# Patient Record
Sex: MALE | Race: WHITE | NOT HISPANIC OR LATINO | Employment: OTHER | URBAN - METROPOLITAN AREA
[De-identification: names, ages, dates, MRNs, and addresses within clinical notes are randomized per-mention and may not be internally consistent; named-entity substitution may affect disease eponyms.]

---

## 2018-04-18 ENCOUNTER — HISTORICAL (OUTPATIENT)
Dept: LAB | Facility: HOSPITAL | Age: 78
End: 2018-04-18

## 2018-04-18 LAB
ABS NEUT (OLG): 2.92 X10(3)/MCL (ref 2.1–9.2)
ALBUMIN SERPL-MCNC: 3.1 GM/DL (ref 3.4–5)
ALBUMIN/GLOB SERPL: 1.3 {RATIO}
ALP SERPL-CCNC: 40 UNIT/L (ref 50–136)
ALT SERPL-CCNC: 27 UNIT/L (ref 12–78)
AST SERPL-CCNC: 27 UNIT/L (ref 15–37)
BASOPHILS # BLD AUTO: 0 X10(3)/MCL (ref 0–0.2)
BASOPHILS NFR BLD AUTO: 1 %
BILIRUB SERPL-MCNC: 0.7 MG/DL (ref 0.2–1)
BILIRUBIN DIRECT+TOT PNL SERPL-MCNC: 0.1 MG/DL (ref 0–0.2)
BILIRUBIN DIRECT+TOT PNL SERPL-MCNC: 0.6 MG/DL (ref 0–0.8)
BUN SERPL-MCNC: 23 MG/DL (ref 7–18)
CALCIUM SERPL-MCNC: 7.9 MG/DL (ref 8.5–10.1)
CHLORIDE SERPL-SCNC: 111 MMOL/L (ref 98–107)
CO2 SERPL-SCNC: 22 MMOL/L (ref 21–32)
CREAT SERPL-MCNC: 1.18 MG/DL (ref 0.7–1.3)
EOSINOPHIL # BLD AUTO: 0.1 X10(3)/MCL (ref 0–0.9)
EOSINOPHIL NFR BLD AUTO: 2 %
ERYTHROCYTE [DISTWIDTH] IN BLOOD BY AUTOMATED COUNT: 11.9 % (ref 11.5–17)
GLOBULIN SER-MCNC: 2.3 GM/DL (ref 2.4–3.5)
GLUCOSE SERPL-MCNC: 204 MG/DL (ref 74–106)
HCT VFR BLD AUTO: 32.9 % (ref 42–52)
HGB BLD-MCNC: 11.4 GM/DL (ref 14–18)
LYMPHOCYTES # BLD AUTO: 0.7 X10(3)/MCL (ref 0.6–4.6)
LYMPHOCYTES NFR BLD AUTO: 18 %
MCH RBC QN AUTO: 32.9 PG (ref 27–31)
MCHC RBC AUTO-ENTMCNC: 34.7 GM/DL (ref 33–36)
MCV RBC AUTO: 94.8 FL (ref 80–94)
MONOCYTES # BLD AUTO: 0.3 X10(3)/MCL (ref 0.1–1.3)
MONOCYTES NFR BLD AUTO: 7 %
NEUTROPHILS # BLD AUTO: 2.92 X10(3)/MCL (ref 2.1–9.2)
NEUTROPHILS NFR BLD AUTO: 72 %
PLATELET # BLD AUTO: 145 X10(3)/MCL (ref 130–400)
PMV BLD AUTO: 10.5 FL (ref 9.4–12.4)
POTASSIUM SERPL-SCNC: 4 MMOL/L (ref 3.5–5.1)
PROT SERPL-MCNC: 5.4 GM/DL (ref 6.4–8.2)
RBC # BLD AUTO: 3.47 X10(6)/MCL (ref 4.7–6.1)
SODIUM SERPL-SCNC: 141 MMOL/L (ref 136–145)
TSH SERPL-ACNC: 1.14 MIU/ML (ref 0.36–3.74)
WBC # SPEC AUTO: 4.1 X10(3)/MCL (ref 4.5–11.5)

## 2022-04-10 ENCOUNTER — HISTORICAL (OUTPATIENT)
Dept: ADMINISTRATIVE | Facility: HOSPITAL | Age: 82
End: 2022-04-10

## 2022-04-11 ENCOUNTER — HISTORICAL (OUTPATIENT)
Dept: ADMINISTRATIVE | Facility: HOSPITAL | Age: 82
End: 2022-04-11

## 2022-04-28 VITALS
DIASTOLIC BLOOD PRESSURE: 59 MMHG | WEIGHT: 145.75 LBS | HEIGHT: 68 IN | BODY MASS INDEX: 22.09 KG/M2 | OXYGEN SATURATION: 97 % | SYSTOLIC BLOOD PRESSURE: 108 MMHG

## 2022-04-28 VITALS
DIASTOLIC BLOOD PRESSURE: 59 MMHG | WEIGHT: 145.75 LBS | SYSTOLIC BLOOD PRESSURE: 108 MMHG | OXYGEN SATURATION: 97 % | BODY MASS INDEX: 22.09 KG/M2 | HEIGHT: 68 IN

## 2022-11-07 ENCOUNTER — CLINICAL SUPPORT (OUTPATIENT)
Dept: URGENT CARE | Facility: CLINIC | Age: 82
End: 2022-11-07
Payer: MEDICARE

## 2022-11-07 VITALS — RESPIRATION RATE: 18 BRPM

## 2022-11-07 DIAGNOSIS — Z23 NEED FOR INFLUENZA VACCINATION: Primary | ICD-10-CM

## 2022-11-07 PROCEDURE — G0008 FLU VACCINE - QUADRIVALENT - HIGH DOSE (65+) PRESERVATIVE FREE IM: ICD-10-PCS | Mod: ,,, | Performed by: FAMILY MEDICINE

## 2022-11-07 PROCEDURE — 90662 IIV NO PRSV INCREASED AG IM: CPT | Mod: ,,, | Performed by: FAMILY MEDICINE

## 2022-11-07 PROCEDURE — G0008 ADMIN INFLUENZA VIRUS VAC: HCPCS | Mod: ,,, | Performed by: FAMILY MEDICINE

## 2022-11-07 PROCEDURE — 90662 FLU VACCINE - QUADRIVALENT - HIGH DOSE (65+) PRESERVATIVE FREE IM: ICD-10-PCS | Mod: ,,, | Performed by: FAMILY MEDICINE

## 2023-02-09 ENCOUNTER — DOCUMENTATION ONLY (OUTPATIENT)
Dept: CASE MANAGEMENT | Facility: HOSPITAL | Age: 83
End: 2023-02-09
Payer: MEDICARE

## 2023-02-09 NOTE — PROGRESS NOTES
Sent faxed referral for [facility] on 2/8/23 and spoke with Mercedez regarding pt admission. Mercedez requested referral be sent again electronically. Due to uncompleted documentation, referral sent electronically this morning 2/9/23.

## 2023-02-10 ENCOUNTER — HOSPITAL ENCOUNTER (INPATIENT)
Facility: HOSPITAL | Age: 83
LOS: 13 days | Discharge: HOME HEALTH | DRG: 682 | End: 2023-02-23
Attending: EMERGENCY MEDICINE | Admitting: INTERNAL MEDICINE
Payer: MEDICARE

## 2023-02-10 DIAGNOSIS — R07.9 CHEST PAIN: ICD-10-CM

## 2023-02-10 DIAGNOSIS — R55 SYNCOPE, NEAR: ICD-10-CM

## 2023-02-10 DIAGNOSIS — R00.1 BRADYCARDIA: ICD-10-CM
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
## 2023-02-10 DIAGNOSIS — S32.010A CLOSED COMPRESSION FRACTURE OF BODY OF L1 VERTEBRA: Primary | ICD-10-CM

## 2023-02-10 LAB
ALBUMIN SERPL-MCNC: 3.4 G/DL (ref 3.4–4.8)
ALBUMIN/GLOB SERPL: 1 RATIO (ref 1.1–2)
ALP SERPL-CCNC: 55 UNIT/L (ref 40–150)
ALT SERPL-CCNC: 19 UNIT/L (ref 0–55)
APPEARANCE UR: CLEAR
AST SERPL-CCNC: 28 UNIT/L (ref 5–34)
BACTERIA #/AREA URNS AUTO: NORMAL /HPF
BASOPHILS # BLD AUTO: 0.03 X10(3)/MCL (ref 0–0.2)
BASOPHILS NFR BLD AUTO: 0.5 %
BILIRUB UR QL STRIP.AUTO: NEGATIVE MG/DL
BILIRUBIN DIRECT+TOT PNL SERPL-MCNC: 0.7 MG/DL
BUN SERPL-MCNC: 45 MG/DL (ref 8.4–25.7)
CALCIUM SERPL-MCNC: 9 MG/DL (ref 8.8–10)
CHLORIDE SERPL-SCNC: 109 MMOL/L (ref 98–107)
CO2 SERPL-SCNC: 21 MMOL/L (ref 23–31)
COLOR UR AUTO: YELLOW
CREAT SERPL-MCNC: 1.84 MG/DL (ref 0.73–1.18)
EOSINOPHIL # BLD AUTO: 0.18 X10(3)/MCL (ref 0–0.9)
EOSINOPHIL NFR BLD AUTO: 3.2 %
ERYTHROCYTE [DISTWIDTH] IN BLOOD BY AUTOMATED COUNT: 12.1 % (ref 11.5–17)
GFR SERPLBLD CREATININE-BSD FMLA CKD-EPI: 36 MLS/MIN/1.73/M2
GLOBULIN SER-MCNC: 3.5 GM/DL (ref 2.4–3.5)
GLUCOSE SERPL-MCNC: 94 MG/DL (ref 82–115)
GLUCOSE UR QL STRIP.AUTO: NEGATIVE MG/DL
HCT VFR BLD AUTO: 35.6 % (ref 42–52)
HGB BLD-MCNC: 12 GM/DL (ref 14–18)
IMM GRANULOCYTES # BLD AUTO: 0.03 X10(3)/MCL (ref 0–0.04)
IMM GRANULOCYTES NFR BLD AUTO: 0.5 %
KETONES UR QL STRIP.AUTO: NEGATIVE MG/DL
LEUKOCYTE ESTERASE UR QL STRIP.AUTO: NEGATIVE UNIT/L
LYMPHOCYTES # BLD AUTO: 0.69 X10(3)/MCL (ref 0.6–4.6)
LYMPHOCYTES NFR BLD AUTO: 12.2 %
MCH RBC QN AUTO: 32.3 PG
MCHC RBC AUTO-ENTMCNC: 33.7 MG/DL (ref 33–36)
MCV RBC AUTO: 96 FL (ref 80–94)
MONOCYTES # BLD AUTO: 0.57 X10(3)/MCL (ref 0.1–1.3)
MONOCYTES NFR BLD AUTO: 10.1 %
NEUTROPHILS # BLD AUTO: 4.14 X10(3)/MCL (ref 2.1–9.2)
NEUTROPHILS NFR BLD AUTO: 73.5 %
NITRITE UR QL STRIP.AUTO: NEGATIVE
NRBC BLD AUTO-RTO: 0 %
PH UR STRIP.AUTO: 5.5 [PH]
PLATELET # BLD AUTO: 168 X10(3)/MCL (ref 130–400)
PMV BLD AUTO: 10 FL (ref 7.4–10.4)
POTASSIUM SERPL-SCNC: 4.2 MMOL/L (ref 3.5–5.1)
PROT SERPL-MCNC: 6.9 GM/DL (ref 5.8–7.6)
PROT UR QL STRIP.AUTO: ABNORMAL MG/DL
RBC # BLD AUTO: 3.71 X10(6)/MCL (ref 4.7–6.1)
RBC #/AREA URNS AUTO: <5 /HPF
RBC UR QL AUTO: NEGATIVE UNIT/L
SODIUM SERPL-SCNC: 141 MMOL/L (ref 136–145)
SP GR UR STRIP.AUTO: 1.02 (ref 1–1.03)
SQUAMOUS #/AREA URNS AUTO: <5 /HPF
UROBILINOGEN UR STRIP-ACNC: 0.2 MG/DL
WBC # SPEC AUTO: 5.6 X10(3)/MCL (ref 4.5–11.5)
WBC #/AREA URNS AUTO: <5 /HPF

## 2023-02-10 PROCEDURE — 85025 COMPLETE CBC W/AUTO DIFF WBC: CPT | Performed by: NURSE PRACTITIONER

## 2023-02-10 PROCEDURE — 63600175 PHARM REV CODE 636 W HCPCS: Performed by: INTERNAL MEDICINE

## 2023-02-10 PROCEDURE — 93005 ELECTROCARDIOGRAM TRACING: CPT

## 2023-02-10 PROCEDURE — 96360 HYDRATION IV INFUSION INIT: CPT

## 2023-02-10 PROCEDURE — 81001 URINALYSIS AUTO W/SCOPE: CPT | Performed by: NURSE PRACTITIONER

## 2023-02-10 PROCEDURE — 25000003 PHARM REV CODE 250: Performed by: INTERNAL MEDICINE

## 2023-02-10 PROCEDURE — 63600175 PHARM REV CODE 636 W HCPCS: Performed by: NURSE PRACTITIONER

## 2023-02-10 PROCEDURE — 11000001 HC ACUTE MED/SURG PRIVATE ROOM

## 2023-02-10 PROCEDURE — 93010 ELECTROCARDIOGRAM REPORT: CPT | Mod: ,,, | Performed by: INTERNAL MEDICINE

## 2023-02-10 PROCEDURE — 93010 EKG 12-LEAD: ICD-10-PCS | Mod: ,,, | Performed by: INTERNAL MEDICINE

## 2023-02-10 PROCEDURE — 80053 COMPREHEN METABOLIC PANEL: CPT | Performed by: NURSE PRACTITIONER

## 2023-02-10 PROCEDURE — 25000003 PHARM REV CODE 250

## 2023-02-10 PROCEDURE — 99285 EMERGENCY DEPT VISIT HI MDM: CPT | Mod: 25

## 2023-02-10 RX ORDER — HALOPERIDOL 5 MG/ML
2 INJECTION INTRAMUSCULAR EVERY 6 HOURS PRN
Status: DISCONTINUED | OUTPATIENT
Start: 2023-02-10 | End: 2023-02-23 | Stop reason: HOSPADM

## 2023-02-10 RX ORDER — TAMSULOSIN HYDROCHLORIDE 0.4 MG/1
1 CAPSULE ORAL 2 TIMES DAILY
Status: DISCONTINUED | OUTPATIENT
Start: 2023-02-10 | End: 2023-02-22

## 2023-02-10 RX ORDER — SERTRALINE HYDROCHLORIDE 50 MG/1
50 TABLET, FILM COATED ORAL NIGHTLY
COMMUNITY
Start: 2023-01-11

## 2023-02-10 RX ORDER — FINASTERIDE 5 MG/1
5 TABLET, FILM COATED ORAL DAILY
COMMUNITY
Start: 2023-01-04

## 2023-02-10 RX ORDER — ACETAMINOPHEN 325 MG/1
650 TABLET ORAL EVERY 4 HOURS PRN
Status: DISCONTINUED | OUTPATIENT
Start: 2023-02-10 | End: 2023-02-23 | Stop reason: HOSPADM

## 2023-02-10 RX ORDER — MEMANTINE HYDROCHLORIDE 5 MG/1
10 TABLET ORAL 2 TIMES DAILY
Status: DISCONTINUED | OUTPATIENT
Start: 2023-02-10 | End: 2023-02-22

## 2023-02-10 RX ORDER — TAMSULOSIN HYDROCHLORIDE 0.4 MG/1
1 CAPSULE ORAL 2 TIMES DAILY
COMMUNITY
Start: 2023-01-03

## 2023-02-10 RX ORDER — MORPHINE SULFATE 4 MG/ML
2 INJECTION, SOLUTION INTRAMUSCULAR; INTRAVENOUS EVERY 4 HOURS PRN
Status: DISCONTINUED | OUTPATIENT
Start: 2023-02-10 | End: 2023-02-23 | Stop reason: HOSPADM

## 2023-02-10 RX ORDER — SODIUM CHLORIDE 0.9 % (FLUSH) 0.9 %
10 SYRINGE (ML) INJECTION
Status: DISCONTINUED | OUTPATIENT
Start: 2023-02-10 | End: 2023-02-23 | Stop reason: HOSPADM

## 2023-02-10 RX ORDER — POLYETHYLENE GLYCOL 3350 17 G/17G
17 POWDER, FOR SOLUTION ORAL 2 TIMES DAILY PRN
Status: DISCONTINUED | OUTPATIENT
Start: 2023-02-10 | End: 2023-02-23 | Stop reason: HOSPADM

## 2023-02-10 RX ORDER — MULTIVITAMIN
1 TABLET ORAL DAILY
COMMUNITY

## 2023-02-10 RX ORDER — MEMANTINE HYDROCHLORIDE 10 MG/1
10 TABLET ORAL 2 TIMES DAILY
COMMUNITY
Start: 2022-12-04

## 2023-02-10 RX ORDER — ONDANSETRON 2 MG/ML
4 INJECTION INTRAMUSCULAR; INTRAVENOUS EVERY 4 HOURS PRN
Status: DISCONTINUED | OUTPATIENT
Start: 2023-02-10 | End: 2023-02-23 | Stop reason: HOSPADM

## 2023-02-10 RX ORDER — SODIUM CHLORIDE 450 MG/100ML
INJECTION, SOLUTION INTRAVENOUS CONTINUOUS
Status: DISCONTINUED | OUTPATIENT
Start: 2023-02-10 | End: 2023-02-12

## 2023-02-10 RX ORDER — HYDROCODONE BITARTRATE AND ACETAMINOPHEN 5; 325 MG/1; MG/1
1 TABLET ORAL
Status: COMPLETED | OUTPATIENT
Start: 2023-02-10 | End: 2023-02-10

## 2023-02-10 RX ORDER — FINASTERIDE 5 MG/1
5 TABLET, FILM COATED ORAL DAILY
Status: DISCONTINUED | OUTPATIENT
Start: 2023-02-11 | End: 2023-02-23 | Stop reason: HOSPADM

## 2023-02-10 RX ORDER — HYDROCODONE BITARTRATE AND ACETAMINOPHEN 5; 325 MG/1; MG/1
1 TABLET ORAL EVERY 6 HOURS PRN
Status: DISCONTINUED | OUTPATIENT
Start: 2023-02-10 | End: 2023-02-13

## 2023-02-10 RX ORDER — AMOXICILLIN 250 MG
2 CAPSULE ORAL 2 TIMES DAILY PRN
Status: DISCONTINUED | OUTPATIENT
Start: 2023-02-10 | End: 2023-02-22

## 2023-02-10 RX ORDER — MAG HYDROX/ALUMINUM HYD/SIMETH 200-200-20
30 SUSPENSION, ORAL (FINAL DOSE FORM) ORAL 4 TIMES DAILY PRN
Status: DISCONTINUED | OUTPATIENT
Start: 2023-02-10 | End: 2023-02-23 | Stop reason: HOSPADM

## 2023-02-10 RX ORDER — HYDRALAZINE HYDROCHLORIDE 20 MG/ML
10 INJECTION INTRAMUSCULAR; INTRAVENOUS EVERY 4 HOURS PRN
Status: DISCONTINUED | OUTPATIENT
Start: 2023-02-10 | End: 2023-02-23 | Stop reason: HOSPADM

## 2023-02-10 RX ORDER — ENOXAPARIN SODIUM 100 MG/ML
30 INJECTION SUBCUTANEOUS EVERY 24 HOURS
Status: DISCONTINUED | OUTPATIENT
Start: 2023-02-11 | End: 2023-02-14

## 2023-02-10 RX ORDER — PROCHLORPERAZINE EDISYLATE 5 MG/ML
5 INJECTION INTRAMUSCULAR; INTRAVENOUS EVERY 6 HOURS PRN
Status: DISCONTINUED | OUTPATIENT
Start: 2023-02-10 | End: 2023-02-23 | Stop reason: HOSPADM

## 2023-02-10 RX ORDER — QUETIAPINE FUMARATE 25 MG/1
50 TABLET, FILM COATED ORAL NIGHTLY PRN
Status: DISCONTINUED | OUTPATIENT
Start: 2023-02-10 | End: 2023-02-11

## 2023-02-10 RX ORDER — SIMETHICONE 80 MG
1 TABLET,CHEWABLE ORAL 4 TIMES DAILY PRN
Status: DISCONTINUED | OUTPATIENT
Start: 2023-02-10 | End: 2023-02-23 | Stop reason: HOSPADM

## 2023-02-10 RX ORDER — ASCORBIC ACID 500 MG
500 TABLET ORAL DAILY
COMMUNITY

## 2023-02-10 RX ORDER — FLUTICASONE PROPIONATE 50 MCG
1 SPRAY, SUSPENSION (ML) NASAL DAILY PRN
COMMUNITY
Start: 2022-12-18

## 2023-02-10 RX ORDER — TALC
6 POWDER (GRAM) TOPICAL NIGHTLY PRN
Status: DISCONTINUED | OUTPATIENT
Start: 2023-02-10 | End: 2023-02-17

## 2023-02-10 RX ORDER — ACETAMINOPHEN 500 MG
1000 TABLET ORAL EVERY 6 HOURS PRN
Status: DISCONTINUED | OUTPATIENT
Start: 2023-02-10 | End: 2023-02-14

## 2023-02-10 RX ORDER — SERTRALINE HYDROCHLORIDE 50 MG/1
50 TABLET, FILM COATED ORAL NIGHTLY
Status: DISCONTINUED | OUTPATIENT
Start: 2023-02-10 | End: 2023-02-13

## 2023-02-10 RX ADMIN — SODIUM CHLORIDE 1000 ML: 900 INJECTION, SOLUTION INTRAVENOUS at 02:02

## 2023-02-10 RX ADMIN — SODIUM CHLORIDE: 4.5 INJECTION, SOLUTION INTRAVENOUS at 09:02

## 2023-02-10 RX ADMIN — SERTRALINE HYDROCHLORIDE 50 MG: 50 TABLET ORAL at 09:02

## 2023-02-10 RX ADMIN — HYDROCODONE BITARTRATE AND ACETAMINOPHEN 1 TABLET: 5; 325 TABLET ORAL at 02:02

## 2023-02-10 RX ADMIN — ONDANSETRON 4 MG: 2 INJECTION INTRAMUSCULAR; INTRAVENOUS at 09:02

## 2023-02-10 RX ADMIN — MORPHINE SULFATE 2 MG: 4 INJECTION INTRAVENOUS at 09:02

## 2023-02-10 RX ADMIN — HALOPERIDOL LACTATE 2 MG: 5 INJECTION, SOLUTION INTRAMUSCULAR at 09:02

## 2023-02-10 RX ADMIN — TAMSULOSIN HYDROCHLORIDE 0.4 MG: 0.4 CAPSULE ORAL at 09:02

## 2023-02-10 RX ADMIN — MEMANTINE 10 MG: 5 TABLET ORAL at 09:02

## 2023-02-10 NOTE — ED PROVIDER NOTES
Encounter Date: 2/10/2023       History     Chief Complaint   Patient presents with    Back Pain     Unresolved Back pain after fall last Tuesday. C/o hip pain , reports another fall last night and landed on left hip, not on BT, denies hitting head, no LOC.. Noted with aspen brace.     Dysuria     The patient is a 82 y.o. male who presents to the Emergency Department with a chief complaint of fall. Daughter at bedside reports patient was diagnosed with L1 Compression fracture two days ago. He was placed in aspen brace and was scheduled to follow up with neurosurgery on Tuesday. Daughter states sometime last night he experienced an unwitnessed fall and was found the the floor. It is unclear if the patient hit his head or neck. Daughter states he seems more confused that normal over the last few days. She also reports that he has been having difficulty urinating. Symptoms began 2 days ago and have been intermittent since onset. His pain is currently rated as a 2/10 in severity and described as aching with no radiation. Associated symptoms include lower back pain. Symptoms are aggravated with movement and ambulation and there are no alleviating factors. The patient denies numbness or tingling to extremities, loss of bowel/bladder, saddle anesthesia. He reports taking nothing prior to arrival with no relief of symptoms. No other reported symptoms at this time     The history is provided by the patient and a caregiver. No  was used.   Fall  The accident occurred yesterday. The fall occurred while walking. He fell from a height of 3 to 5 ft. He landed on A hard floor. There was no blood loss. The point of impact was the left hip. The pain is present in the back. The pain is at a severity of 2/10. He was Ambulatory at the scene. There was No entrapment after the fall. There was No drug use involved in the accident. There was No alcohol use involved in the accident. Associated symptoms include back pain.  Pertinent negatives include no neck pain, no paresthesias, no paralysis, no visual change, no fever, no numbness, no abdominal pain, no bowel incontinence, no nausea, no vomiting, no headaches, no loss of consciousness and no tingling. The symptoms are aggravated by activity. He has tried nothing for the symptoms. The treatment provided no relief.   Review of patient's allergies indicates:  No Known Allergies  History reviewed. No pertinent past medical history.  History reviewed. No pertinent surgical history.  History reviewed. No pertinent family history.  Social History     Tobacco Use    Smoking status: Never    Smokeless tobacco: Never     Review of Systems   Constitutional:  Negative for fever.   HENT:  Negative for congestion, rhinorrhea and sore throat.    Respiratory:  Negative for shortness of breath.    Cardiovascular:  Negative for chest pain.   Gastrointestinal:  Negative for abdominal pain, bowel incontinence, nausea and vomiting.   Genitourinary:  Negative for dysuria, frequency and urgency.   Musculoskeletal:  Positive for back pain. Negative for neck pain.   Skin:  Negative for rash.   Neurological:  Negative for tingling, loss of consciousness, weakness, numbness, headaches and paresthesias.   Hematological:  Does not bruise/bleed easily.   Psychiatric/Behavioral:  Positive for confusion. Negative for behavioral problems.    All other systems reviewed and are negative.    Physical Exam     Initial Vitals   BP Pulse Resp Temp SpO2   02/10/23 1155 02/10/23 1152 02/10/23 1152 02/10/23 1152 02/10/23 1152   (!) 107/52 64 18 97.3 °F (36.3 °C) 100 %      MAP       --                Physical Exam    Nursing note and vitals reviewed.  Constitutional: Vital signs are normal. He appears well-developed and well-nourished.   HENT:   Head: Normocephalic.   Right Ear: Hearing and tympanic membrane normal.   Left Ear: Hearing and tympanic membrane normal.   Mouth/Throat: Uvula is midline, oropharynx is clear and  moist and mucous membranes are normal.   Cardiovascular:  Normal rate, regular rhythm, normal heart sounds and normal pulses.           Pulmonary/Chest: Effort normal and breath sounds normal.   Abdominal: Abdomen is soft. Bowel sounds are normal. There is no abdominal tenderness.   Musculoskeletal:      Cervical back: No spinous process tenderness or muscular tenderness.      Lumbar back: Tenderness present.      Right hip: Normal.      Left hip: Normal.      Comments: Aspen LSO brace in place.          Lymphadenopathy:     He has no cervical adenopathy.   Neurological: He is alert. He has normal strength. GCS eye subscore is 4. GCS verbal subscore is 4. GCS motor subscore is 6.   5/5 strength to bilateral upper and lower extremities    Skin: Skin is warm, dry and intact. Capillary refill takes less than 2 seconds.       ED Course   Procedures  Labs Reviewed   URINALYSIS, REFLEX TO URINE CULTURE - Abnormal; Notable for the following components:       Result Value    Protein, UA Trace (*)     All other components within normal limits   CBC WITH DIFFERENTIAL - Abnormal; Notable for the following components:    RBC 3.71 (*)     Hgb 12.0 (*)     Hct 35.6 (*)     MCV 96.0 (*)     All other components within normal limits   COMPREHENSIVE METABOLIC PANEL - Abnormal; Notable for the following components:    Chloride 109 (*)     Carbon Dioxide 21 (*)     Blood Urea Nitrogen 45.0 (*)     Creatinine 1.84 (*)     Albumin/Globulin Ratio 1.0 (*)     All other components within normal limits   URINALYSIS, MICROSCOPIC - Normal          Imaging Results              CT Head Without Contrast (Final result)  Result time 02/10/23 17:25:11      Final result by Lavinia Jason MD (02/10/23 17:25:11)                   Impression:      Chronic age-related changes.  No acute process      Electronically signed by: Lavinia Jason  Date:    02/10/2023  Time:    17:25               Narrative:    EXAMINATION:  CT HEAD WITHOUT  CONTRAST    CLINICAL HISTORY:  Head trauma, minor (Age >= 65y);    TECHNIQUE:  Multiple axial images were obtained from the base of the brain to the vertex without contrast administration.  Sagittal and coronal reconstructions were performed..Automatic exposure control is utilized to reduce patient radiation exposure.    COMPARISON:  None    FINDINGS:  There is no intracranial mass or lesion seen.  No hemorrhage is seen.  No acute infarct is seen.  There is some cerebral atrophy seen.  There is some compensatory ventricular dilatation and periventricular white matter changes consistent with the patient's age.  Calvarium is intact.  The posterior fossa is unremarkable..  The visualized portions of the paranasal sinuses show no acute abnormality.                                       CT Cervical Spine Without Contrast (Final result)  Result time 02/10/23 17:28:14      Final result by Lavinia Jason MD (02/10/23 17:28:14)                   Impression:      Degenerative changes in the cervical spine    No evidence of acute trauma      Electronically signed by: Lavinia Jason  Date:    02/10/2023  Time:    17:28               Narrative:    EXAMINATION:  CT CERVICAL SPINE WITHOUT CONTRAST    CLINICAL HISTORY:  Neck trauma (Age >= 65y);    TECHNIQUE:  Low dose axial images, sagittal and coronal reformations were performed though the cervical spine.  Contrast was not administered. Automatic exposure control is utilized to reduce patient radiation exposure.    COMPARISON:  None    FINDINGS:  The vertebral body heights are well maintained.  The alignment is well maintained.  The bones are  osteoporotic.  There are degenerative changes seen throughout the cervical spine.  No evidence of acute fracture is seen.  No dislocation is seen.  Odontoid lateral masses appear grossly unremarkable.  No soft tissue abnormality is seen.  No retropharyngeal abnormality is seen.  Visualized portions of the airway appear grossly  unremarkable.  Visualized portion of the thoracic inlet appears unremarkable                                       X-Ray Hip 2 or 3 views Left (with Pelvis when performed) (Final result)  Result time 02/10/23 12:28:55      Final result by Latasha Barba MD (02/10/23 12:28:55)                   Impression:      No acute osseous abnormality.      Electronically signed by: Latasha Barba  Date:    02/10/2023  Time:    12:28               Narrative:    EXAMINATION:  XR HIP WITH PELVIS WHEN PERFORMED, 2 OR 3 VIEWS LEFT    CLINICAL HISTORY:  Unspecified fall, initial encounter    TECHNIQUE:  AP view of the pelvis and AP and frog leg lateral view of the left hip were performed.    COMPARISON:  None.    FINDINGS:  BONE: No fracture. No dislocation.    SOFT TISSUES: There are surgical anchors related to hernia repair.                                       Medications   sodium chloride 0.9% bolus 1,000 mL 1,000 mL (0 mLs Intravenous Stopped 2/10/23 1549)   HYDROcodone-acetaminophen 5-325 mg per tablet 1 tablet (1 tablet Oral Given 2/10/23 1447)     Medical Decision Making:   Initial Assessment:   The patient is a 82 y.o. male who presents to the Emergency Department with a chief complaint of fall. Daughter at bedside reports patient was diagnosed with L1 Compression fracture two days ago. He was placed in aspen brace and was scheduled to follow up with neurosurgery on Tuesday. Daughter states sometime last night he experienced an unwitnessed fall and was found the the floor. It is unclear if the patient hit his head or neck. Daughter states he seems more confused that normal over the last few days. She also reports that he has been having difficulty urinating. Symptoms began 2 days ago and have been intermittent since onset. His pain is currently rated as a 2/10 in severity and described as aching with no radiation. Associated symptoms include lower back pain. Symptoms are aggravated with movement and ambulation and  there are no alleviating factors. The patient denies numbness or tingling to extremities, loss of bowel/bladder, saddle anesthesia. He reports taking nothing prior to arrival with no relief of symptoms. No other reported symptoms at this time   Differential Diagnosis:   Fall, lumbar fracture, urinary tract infection, intracranial bleeding, cervical fracture, CVA  Clinical Tests:   Lab Tests: Ordered and Reviewed  Radiological Study: Ordered and Reviewed  ED Management:  MDM  History obtained by patient.   Co-morbidities and/or factors adding to the complexity or risk for the patient?: age  Differential diagnoses: Fall, lumbar fracture, urinary tract infection, intracranial bleeding, cervical fracture, CVA  Decision to obtain previous or outside records?: yes  Chart Review (nursing home, outside records, CareEverywhere): reviewed previous ER records  Review of RX medications/new RX prescribed by me?: no  My EKG Interpretation: see above  Labs/imaging/other tests obtained/considered (risk/benefits of testing discussed): CBC, CMP, CT head and neck  Labs/tests intepretation: normal imaging. BUN and creatinine elevated  My independent imaging interpretation: none  Treatment/interventions, IV fluids, IV medications: PO norco  Complex management (IV controlled substances, went to OR, DNR, meds requiring monitoring, transfer, etc)?: none  Workup/treatment affected by social determinants of health?: none   Point of care US done/interpretation: none  Consults/radiologist/EMS/social work/family discussion/alternate history: none  Advanced care planning/end of life discussion: none  Shared decision making: Shared decision making with patient  ETOH/smoking/drug cessation discussion: n/a  Dispo: admit. Daughter concerned about multiple falls at home. States he lives with wife who is able to help much.    Other:   I have discussed this case with another health care provider.       <> Summary of the Discussion: Discussed with ED  attending, , he had face to face encounter with patient.     Discussed with AFUA Sanchez with hospital medicine. She accepts admission for Dr. Lawson           ED Course as of 02/10/23 1853   Fri Feb 10, 2023   1403 BUN(!): 45.0 [LM]   1403 Creatinine(!): 1.84 [LM]      ED Course User Index  [LM] Kaur Schaffer NP                 Clinical Impression:   Final diagnoses:  [W19.XXXA] Fall  [S32.010A] Closed compression fracture of body of L1 vertebra (Primary)        ED Disposition Condition    Admit Stable                Kaur Schaffer NP  02/10/23 1817       Kaur Schaffer NP  02/10/23 1853

## 2023-02-10 NOTE — FIRST PROVIDER EVALUATION
Medical screening examination initiated.  I have conducted a focused provider triage encounter, findings are as follows:  Chief Complaint   Patient presents with    Back Pain     Unresolved Back pain after fall last Tuesday. C/o hip pain , reports another fall last night and landed on left hip, not on BT, denies hitting head, no LOC.. Noted with aspen brace.     Dysuria         Brief history of present illness:  81 y/o male who presents with known L1 fracture from 2/8 CT scan (in computer). Had fall last night and hurt his left hip. No head injury. In back brace. Also having difficulty urinating.     Vitals:    02/10/23 1152 02/10/23 1155   BP:  (!) 107/52   Pulse: 64    Resp: 18    Temp: 97.3 °F (36.3 °C)    TempSrc: Oral    SpO2: 100%        Pertinent physical exam:  alert, in wheelchair, nonlabored    Brief workup plan:  labs, imaging    Preliminary workup initiated; this workup will be continued and followed by the physician or advanced practice provider that is assigned to the patient when roomed.

## 2023-02-10 NOTE — Clinical Note
Diagnosis: Fall [239497]   Admitting Provider:: DEBORAH OLIVER [[id number]]   Future Attending Provider: DEBORAH OLIVER [[id number]]   Reason for IP Medical Treatment  (Clinical interventions that can only be accomplished in the IP setting? ) :: multiple falls, TEJAS?   Estimated Length of Stay:: 2 midnights   I certify that Inpatient services for greater than or equal to 2 midnights are medically necessary:: Yes   Plans for Post-Acute care--if anticipated (pick the single best option):: A. No post acute care anticipated at this time

## 2023-02-11 LAB
ANION GAP SERPL CALC-SCNC: 11 MEQ/L
BUN SERPL-MCNC: 37.8 MG/DL (ref 8.4–25.7)
CALCIUM SERPL-MCNC: 8.3 MG/DL (ref 8.8–10)
CHLORIDE SERPL-SCNC: 107 MMOL/L (ref 98–107)
CO2 SERPL-SCNC: 22 MMOL/L (ref 23–31)
CREAT SERPL-MCNC: 1.67 MG/DL (ref 0.73–1.18)
CREAT/UREA NIT SERPL: 23
ERYTHROCYTE [DISTWIDTH] IN BLOOD BY AUTOMATED COUNT: 12 % (ref 11.5–17)
GFR SERPLBLD CREATININE-BSD FMLA CKD-EPI: 41 MLS/MIN/1.73/M2
GLUCOSE SERPL-MCNC: 92 MG/DL (ref 82–115)
HCT VFR BLD AUTO: 31.8 % (ref 42–52)
HGB BLD-MCNC: 10.4 GM/DL (ref 14–18)
MAGNESIUM SERPL-MCNC: 1.9 MG/DL (ref 1.6–2.6)
MCH RBC QN AUTO: 31.8 PG
MCHC RBC AUTO-ENTMCNC: 32.7 MG/DL (ref 33–36)
MCV RBC AUTO: 97.2 FL (ref 80–94)
NRBC BLD AUTO-RTO: 0 %
PHOSPHATE SERPL-MCNC: 3.2 MG/DL (ref 2.3–4.7)
PLATELET # BLD AUTO: 152 X10(3)/MCL (ref 130–400)
PMV BLD AUTO: 9.9 FL (ref 7.4–10.4)
POTASSIUM SERPL-SCNC: 4.2 MMOL/L (ref 3.5–5.1)
RBC # BLD AUTO: 3.27 X10(6)/MCL (ref 4.7–6.1)
SODIUM SERPL-SCNC: 140 MMOL/L (ref 136–145)
WBC # SPEC AUTO: 5.8 X10(3)/MCL (ref 4.5–11.5)

## 2023-02-11 PROCEDURE — 84100 ASSAY OF PHOSPHORUS: CPT | Performed by: INTERNAL MEDICINE

## 2023-02-11 PROCEDURE — 63600175 PHARM REV CODE 636 W HCPCS: Performed by: NURSE PRACTITIONER

## 2023-02-11 PROCEDURE — 80048 BASIC METABOLIC PNL TOTAL CA: CPT | Performed by: INTERNAL MEDICINE

## 2023-02-11 PROCEDURE — 11000001 HC ACUTE MED/SURG PRIVATE ROOM

## 2023-02-11 PROCEDURE — 63600175 PHARM REV CODE 636 W HCPCS: Performed by: INTERNAL MEDICINE

## 2023-02-11 PROCEDURE — 25000003 PHARM REV CODE 250: Performed by: INTERNAL MEDICINE

## 2023-02-11 PROCEDURE — 51702 INSERT TEMP BLADDER CATH: CPT

## 2023-02-11 PROCEDURE — 85027 COMPLETE CBC AUTOMATED: CPT | Performed by: INTERNAL MEDICINE

## 2023-02-11 PROCEDURE — 83735 ASSAY OF MAGNESIUM: CPT | Performed by: INTERNAL MEDICINE

## 2023-02-11 PROCEDURE — 97162 PT EVAL MOD COMPLEX 30 MIN: CPT

## 2023-02-11 PROCEDURE — 25000003 PHARM REV CODE 250: Performed by: NURSE PRACTITIONER

## 2023-02-11 RX ORDER — QUETIAPINE FUMARATE 25 MG/1
25 TABLET, FILM COATED ORAL NIGHTLY
Status: DISCONTINUED | OUTPATIENT
Start: 2023-02-11 | End: 2023-02-13

## 2023-02-11 RX ADMIN — HYDROCODONE BITARTRATE AND ACETAMINOPHEN 1 TABLET: 5; 325 TABLET ORAL at 05:02

## 2023-02-11 RX ADMIN — MEMANTINE 10 MG: 5 TABLET ORAL at 08:02

## 2023-02-11 RX ADMIN — FINASTERIDE 5 MG: 5 TABLET, FILM COATED ORAL at 08:02

## 2023-02-11 RX ADMIN — QUETIAPINE FUMARATE 25 MG: 25 TABLET ORAL at 08:02

## 2023-02-11 RX ADMIN — TAMSULOSIN HYDROCHLORIDE 0.4 MG: 0.4 CAPSULE ORAL at 08:02

## 2023-02-11 RX ADMIN — THERA TABS 1 TABLET: TAB at 08:02

## 2023-02-11 RX ADMIN — HALOPERIDOL LACTATE 2 MG: 5 INJECTION, SOLUTION INTRAMUSCULAR at 01:02

## 2023-02-11 RX ADMIN — ENOXAPARIN SODIUM 30 MG: 30 INJECTION SUBCUTANEOUS at 07:02

## 2023-02-11 RX ADMIN — Medication 6 MG: at 02:02

## 2023-02-11 RX ADMIN — SODIUM CHLORIDE: 4.5 INJECTION, SOLUTION INTRAVENOUS at 08:02

## 2023-02-11 RX ADMIN — SODIUM CHLORIDE: 4.5 INJECTION, SOLUTION INTRAVENOUS at 10:02

## 2023-02-11 RX ADMIN — SERTRALINE HYDROCHLORIDE 50 MG: 50 TABLET ORAL at 08:02

## 2023-02-11 RX ADMIN — ACETAMINOPHEN 1000 MG: 500 TABLET, FILM COATED ORAL at 08:02

## 2023-02-11 RX ADMIN — HYDROCODONE BITARTRATE AND ACETAMINOPHEN 1 TABLET: 5; 325 TABLET ORAL at 12:02

## 2023-02-11 NOTE — PLAN OF CARE
Problem: Physical Therapy  Goal: Physical Therapy Goal  Description: Goals to be met by: 2023     Patient will increase functional independence with mobility by performin. Supine to sit with Modified [place]  2. Sit to supine with Modified [place]  3. Bed to chair transfer with Modified [place] using No Assistive Device  4. Gait  x 400 feet with [place] using No Assistive Device.     Outcome: Ongoing, Progressing

## 2023-02-11 NOTE — PT/OT/SLP EVAL
Physical Therapy Evaluation    Patient Name:  John Collado   MRN:  [id number]    Recommendations:     Discharge Recommendations: home health PT   Discharge Equipment Recommendations: walker, rolling   Barriers to discharge: None    Assessment:     John Collado is a 82 y.o. male admitted with a medical diagnosis of Fall, B hip pain, L1 compression fx.  He presents with the following impairments/functional limitations: weakness, impaired endurance, gait instability, decreased lower extremity function .    Rehab Prognosis: Good; patient would benefit from acute skilled PT services to address these deficits and reach maximum level of function.    Recent Surgery: * No surgery found *      Plan:     During this hospitalization, patient to be seen 6 x/week to address the identified rehab impairments via gait training, therapeutic activities, therapeutic exercises, neuromuscular re-education and progress toward the following goals:    Plan of Care Expires:       Subjective     Chief Complaint: No complaints  Patient/Family Comments/goals: To return to home health and increase stability in gait.  Pain/Comfort:  Location - Side 1: Bilateral  Location - Orientation 1: midline  Location 1:  (back)  Pain Addressed 1: Reposition, Nurse notified    Patients cultural, spiritual, Uatsdin conflicts given the current situation: no    Living Environment:  Home with spouse  Prior to admission, patients level of function was Independent runner.  Equipment used at home:  .  DME owned (not currently used): none.  Upon discharge, patient will have assistance from spouse.    Objective:     Communicated with RN prior to session.  Patient found supine with mead catheter  and roll belt upon PT entry to room.    General Precautions: Standard,    Orthopedic Precautions:    Braces: TLSO  Respiratory Status: Room air    Exams:    RLE ROM: WNL  RLE Strength: 4/5 quad, 3+/5 hip flexor  LLE ROM: WNL  LLE Strength: 4/5 quad, 3+/5 hip  flexor    Functional Mobility:  Bed Mobility:     Scooting: minimum assistance  Supine to Sit: minimum assistance  Transfers:     Sit to Stand:  minimum assistance with rolling walker  Gait: 100ft with RW and TLSO, mod Vcs for steering and walker safety         Patient left HOB elevated with all lines intact and restraints reapplied at end of session.    GOALS:   Multidisciplinary Problems       Physical Therapy Goals          Problem: Physical Therapy    Goal Priority Disciplines Outcome Goal Variances Interventions   Physical Therapy Goal     PT, PT/OT Ongoing, Progressing     Description: Goals to be met by: 2023     Patient will increase functional independence with mobility by performin. Supine to sit with Modified [place]  2. Sit to supine with Modified [place]  3. Bed to chair transfer with Modified [place] using No Assistive Device  4. Gait  x 400 feet with [place] using No Assistive Device.                          History:     History reviewed. No pertinent past medical history.    History reviewed. No pertinent surgical history.    Time Tracking:     PT Received On: 23  PT Start Time: 1150     PT Stop Time: 1220  PT Total Time (min): 30 min     Billable Minutes: Evaluation 30 mins      2023

## 2023-02-11 NOTE — CONSULTS
[facility]                       [address] [place] [id number]    PATIENT NAME:       RAMONITA HAMLIN  YOB: 1940  CSN:                [id number]   MRN:                [id number]  ADMIT DATE:         02/10/2023 11:59:00  PHYSICIAN:          Varun Partida MD                            CONSULTATION    DATE OF CONSULT:  2/11/2023    HISTORY OF PRESENT ILLNESS:  Patient is an gentleman, with severe   dementia, recently diagnosed with an L1 compression fracture, who presented to   the ED with left hip pain.  Patient's daughter was with him.  He lives with his   wife, is independent and functional ADLs.  Patient's wife was present during his   fall.  There was no head trauma or loss of consciousness.  We have been   consulted for urinary retention.  It is unclear whether he has a urologist.  He   has baseline bradycardia.    PAST MEDICAL HISTORY:  Dementia, L1 compression fracture.    SURGICAL HISTORY:  No pertinent surgical history identified.    ALLERGIES:  NO KNOWN DRUG ALLERGIES.     PHYSICAL EXAM:  VITAL SIGNS:  Patient is afebrile.  His vital signs are stable.    GENERAL:  In no acute distress.  ABDOMEN:  Soft, nontender and nondistended.    Flanks nontender.  NEUROLOGIC:  Patient is demented.  He is moving around on the   bed.  He is on 4-point restraints.  Looks comfortable but agitated.  :    Catheter is in position.  Urine is crystal clear.    ASSESSMENT AND PLAN:  Apparently, he was in urinary retention when he came to   the hospital.  A catheter was placed, it put out about 1500 mL of clear yellow   urine.  He is on Flomax and finasteride.  Plan is for him to continue these   medications.  He will need a voiding trial in a week.  This can be performed as   an outpatient.  If he does not have a primary urologist, we would be happy to   follow him.        ______________________________  Varun BARROSO  MD [place] Partida/WEN  DD:  02/11/2023  Time:  04:36PM  DT:  02/11/2023  Time:  05:18PM  Job #:  [id number]      CONSULTATION

## 2023-02-11 NOTE — NURSING
Nurses Note -- 4 Eyes      2/11/2023   3:01 AM      Skin assessed during: Admit      [x] No Pressure Injuries Present    []Prevention Measures Documented      [] Yes- Altered Skin Integrity Present or Discovered   [] LDA Added if Not in Epic (Describe Wound)   [] New Altered Skin Integrity was Present on Admit and Documented in LDA   [] Wound Image Taken    Wound Care Consulted? No    Attending Nurse:  Teddy Cutler RN     Second RN/Staff Member:  Denise Howard RN

## 2023-02-11 NOTE — H&P
[facility] Hospital Medicine   History & Physical Note      Patient Name: John Collado  : 1940  MRN: [id number]  PCP: John Meyers MD  Admitting Service: Hospital Medicine  Attending Physician: Carrol James MD  Admission Date: 2/10/2023 - IP- Inpatient   Length of Stay: 0  History source: EMR, patient and/or patient's family  Code status: Full    Chief Complaint   Back Pain (Unresolved Back pain after fall last Tuesday. C/o hip pain , reports another fall last night and landed on left hip, not on BT, denies hitting head, no LOC.. Noted with aspen brace. ) and Dysuria      History of Present Illness   82-year-old male with dementia and recently diagnosed L1 compression fracture on 22 presents to the ED with complaints of left hip pain after sustaining another ground level fall yesterday evening. Pt daughter at bedside, gives report. Pt lives with wife and is independent of functional ADLs. Pt wife was present during initial fall and there was no head trauma or LOC however details of fall yesterday are unclear. Pt with baseline bradycardia per daughter, stating that he runs 1 miles daily up until a few days ago.     Initial ED VS:  Temperature 97.3°, heart rate 64, respirations 18, blood pressure 149/77.  While in the ED patient with heart rate in the 50s.  EKG currently pending.  X-ray of the hip was negative for acute fractures.  Due to her repeated falls she is being admitted to the hospitalist service for rehab placement.  She also has an mild TEJAS.    ROS   Except as documented, all other systems reviewed and negative     Past Medical History   Dementia   L1 compression fracture diagnosed on 23    Past Surgical History   History reviewed. No pertinent surgical history.    Social History   Denies alcohol, tobacco or illicit drug use.  She lives alone and is independent of ADLs.    Family History   Reviewed and negative    Allergies   Patient has no known  allergies.    Home Medications     Prior to Admission medications    Medication Sig Start Date End Date Taking? Authorizing Provider   ascorbic acid, vitamin C, (VITAMIN C) 500 MG tablet Take 500 mg by mouth once daily.   Yes Historical Provider   cyclobenzaprine (FLEXERIL) 10 MG tablet Take 1 tablet (10 mg total) by mouth 3 (three) times daily as needed for Muscle spasms. 2/8/23 2/13/23 Yes Cody Hernandez IV, MD   finasteride (PROSCAR) 5 mg tablet Take 5 mg by mouth once daily. 1/4/23  Yes Historical Provider   fluticasone propionate (FLONASE) 50 mcg/actuation nasal spray 1 spray by Each Nostril route daily as needed. 12/18/22  Yes Historical Provider   HYDROcodone-acetaminophen (NORCO) 5-325 mg per tablet Take 1 tablet by mouth every 6 (six) hours as needed for Pain. 2/8/23  Yes Cody Hernandez IV, MD   LIDOcaine (LIDODERM) 5 % Place 1 patch onto the skin once daily. Remove & Discard patch within 12 hours or as directed by MD for 7 days 2/8/23 2/15/23 Yes Cody Hernandez IV, MD   memantine (NAMENDA) 10 MG Tab Take 10 mg by mouth 2 (two) times daily. 12/4/22  Yes Historical Provider   multivitamin with folic acid 400 mcg Tab Take 1 tablet by mouth once daily.   Yes Historical Provider   sertraline (ZOLOFT) 50 MG tablet Take 50 mg by mouth every evening. 1/11/23  Yes Historical Provider   tamsulosin (FLOMAX) 0.4 mg Cap Take 1 capsule by mouth 2 (two) times daily. 1/3/23  Yes Historical Provider          Physical Exam   Vital Signs  Pulse:  [53-58]   Resp:  [16-18]   BP: (107-149)/(52-77)   SpO2:  [95 %-96 %]    General: Appears comfortable, pleasantly demented  HEENT: NC/AT  Neck:  No JVD  Chest: CTABL  CVS: bradycardia, Normal S1/S2.  Abdomen: nondistended, normoactive BS, soft and non-tender.  MSK: No obvious deformity or joint swelling, pain to bilateral hips with movement.  Skin: Warm and dry  Neuro: AAOx3, no focal neurological deficit  Psych: Cooperative    Labs     Recent Labs     02/10/23  1235   WBC 5.6   RBC  3.71*   HGB 12.0*   HCT 35.6*   MCV 96.0*   MCH 32.3   MCHC 33.7   RDW 12.1        No results for input(s): PROTIME, INR, PTT, D-DIMER, FERRITIN, IRON, TRANS, TIBC, LABIRON, [id number], FOLATE, LDH, HAPTOGLOBIN, RETICCNTAUTO, RETABS, PERIPSMEAREV in the last 72 hours.   Recent Labs     02/10/23  1235      K 4.2   CHLORIDE 109*   CO2 21*   BUN 45.0*   CREATININE 1.84*   EGFRNORACEVR 36   GLUCOSE 94   CALCIUM 9.0   ALBUMIN 3.4   GLOBULIN 3.5   ALKPHOS 55   ALT 19   AST 28   BILITOT 0.7     No results for input(s): LACTIC in the last 72 hours.  No results for input(s): CPK, TROPONINI in the last 72 hours.       Microbiology Results (last 7 days)       ** No results found for the last 168 hours. **           Imaging     CT Head Without Contrast   Final Result      Chronic age-related changes.  No acute process         Electronically signed by: Lavinia Jason   Date:    02/10/2023   Time:    17:25      CT Cervical Spine Without Contrast   Final Result      Degenerative changes in the cervical spine      No evidence of acute trauma         Electronically signed by: Lavinia Jason   Date:    02/10/2023   Time:    17:28      X-Ray Hip 2 or 3 views Left (with Pelvis when performed)   Final Result      No acute osseous abnormality.         Electronically signed by: Latasha Barba   Date:    02/10/2023   Time:    12:28        Assessment & Plan   Recurrent falls without LOC  Bilateral Hip pain with decreased mobility  L1 compression fracture diagnosed on 02/08/2023  TEJAS on ? CKD  Bradycardia, chronic   Dementia    PLAN:   - Telemetry. EKG pending. Echocardiogram and Carotid US  - PRN analgesics. IVFs, avoid nephrotoxic agents  - PT consult. Fall precautions.  - Discontinue Flexeril (daughter reports AH after taking it yesterday)  - CBC, CMP  - VTE Prophylaxis: Consuelo Mathew, FNP-C have discussed this patients case with Dr. James who agrees with the diagnosis and treatment  plan.  ------------------------------------    Carrol James MD  I performed the substantive portion of this visit. I had a face-to-face time with the patient on [2/10/2023]. I reviewed the nurse practitioner's history, physical exam and plan of care.      History: Hx of dementia, recent fall on 2/8/2023, L! Compression fracture, started on muscle relaxer and analgesics, recurrent falls since then, increasing confusion, sleep cycle reversal and increasing forgetfulness and intermittent agitation.    Physical exam: Alert, pleasantly demented, trying to urinate using the urinal, following commands, no focal motor weakness    Medical decision making:  Discussed with daughter that recurrent falls and increasing forgetfulness and agitation and disturbed sleep cycle likely due to his recent fall/pain/and new medications causing delirium.  Will start low-dose Seroquel 25 mg at bedtime, delirium precaution, continue gentle IV hydration with half-normal saline.  PT evaluation in a.m.    His pain appeared to be controlled at this time will hold further analgesics and muscle relaxers, as no significant pain there is no indication for kyphoplasty at this time.

## 2023-02-11 NOTE — PROGRESS NOTES
[facility] Hospital Medicine Progress Note        Chief Complaint: Inpatient Follow-up for L Hip Pain s/p Fall; L1 Fracture    HPI:   82-year-old male with Dementia and recently diagnosed L1 Compression Fracture on 02/08/22 presents to the ED with complaints of left hip pain after sustaining another ground level fall yesterday evening. Pt daughter at bedside, gives report. Pt lives with wife and is independent of functional ADLs. Pt wife was present during initial fall and there was no head trauma or LOC however details of fall yesterday are unclear. Pt with baseline bradycardia per daughter, stating that he runs 1 miles daily up until a few days ago.      Initial VS showed Temperature 97.3°, heart rate 64, respirations 18, blood pressure 149/77.  While in the ED patient with HR in the 50s.  X-ray of the hip was negative for acute fractures.  Due to his repeated falls he is being admitted to the hospitalist service for PT evaluation & Rehab placement.  He also has an mild TEJAS.    Interval Hx:   Today, Mr Collado was seen at bedside with his wife and daughter.  He stated he was doing well and did not have any new complaints.  He was noted to be somewhat confused & disoriented likely due to baseline dementia.  PT/OT consulted.  Patient noted to have 900 mL in bladder after he had urge to pee and could not void.  Placing Mcfadden and will consult Urology. Echocardiogram and Carotid U/S ordered and pending; will follow up on report.  BUN/creatinine noted to be 37.9/1.67, continued on IV 0.45% NS at 100 mL/hr.    Objective/physical exam:  General: In no acute distress, afebrile  Chest: Clear to auscultation bilaterally  Heart: RRR, +S1, S2, no appreciable murmur  Abdomen: Soft, nontender, BS +  MSK: Warm, no lower extremity edema, no clubbing or cyanosis  Neurologic: Alert and oriented x4, Cranial nerve II-XII intact, Strength 5/5 in all 4 extremities    VITAL SIGNS: 24 HRS MIN & MAX LAST   Temp   Min: 97.4 °F (36.3 °C)  Max: 98.8 °F (37.1 °C) 97.4 °F (36.3 °C)   BP  Min: 77/37  Max: 149/77 (!) 100/57     Pulse  Min: 50  Max: 58  (!) 50     Resp  Min: 16  Max: 18 18   SpO2  Min: 87 %  Max: 96 % (!) 87 %         Recent Labs   Lab 02/10/23  1235 02/11/23  0512   WBC 5.6 5.8   RBC 3.71* 3.27*   HGB 12.0* 10.4*   HCT 35.6* 31.8*   MCV 96.0* 97.2*   MCH 32.3 31.8   MCHC 33.7 32.7*   RDW 12.1 12.0    152   MPV 10.0 9.9       Recent Labs   Lab 02/10/23  1235 02/11/23  0512    140   K 4.2 4.2   CO2 21* 22*   BUN 45.0* 37.8*   CREATININE 1.84* 1.67*   CALCIUM 9.0 8.3*   MG  --  1.90   ALBUMIN 3.4  --    ALKPHOS 55  --    ALT 19  --    AST 28  --    BILITOT 0.7  --      Scheduled Med:   enoxaparin  30 mg Subcutaneous Daily    finasteride  5 mg Oral Daily    memantine  10 mg Oral BID    multivitamin  1 tablet Oral Daily    QUEtiapine  25 mg Oral QHS    sertraline  50 mg Oral QHS    tamsulosin  1 capsule Oral BID      Continuous Infusions:   sodium chloride 0.45% 100 mL/hr at 02/11/23 1035      PRN Meds:  acetaminophen, acetaminophen, aluminum-magnesium hydroxide-simethicone, haloperidol lactate, hydrALAZINE, HYDROcodone-acetaminophen, melatonin, morphine, ondansetron, polyethylene glycol, prochlorperazine, senna-docusate 8.6-50 mg, simethicone, sodium chloride 0.9%     Assessment/Plan:  Recurrent Falls without LOC 2/2 Presyncope vs Dehydration  Bilateral Hip Pain with Decreased Mobility  L1 Compression Fracture (Dx on 02/08)  TEJAS 2/2 Intravascular Depletion  Bradycardia  Dementia  Normocytic Anemia  Urine Retention with TEJAS    Patient continues to be admitted for close monitoring   But continue q.4 hours neuro checks   Echocardiogram, Carotid U/S pending; will follow-up   Patient continued on IV 0.45% NS at 100 mL/R for TEJAS   PT/OT consulted; will follow recommendations for placement   Patient continued on finasteride 5 mg, tamsulosin 0.4 mg, memantine 10 mg, quetiapine 25 mg, sertraline 50 mg  Inserted  Mcfadden catheter and consult Urology; follow recommendations  Continue monitoring patient's symptoms    VTE prophylaxis:  Lovenox    Patient condition:  Stable    Anticipated discharge and Disposition:     Pending    All diagnosis and differential diagnosis have been reviewed; assessment and plan has been documented; I have personally reviewed the labs and test results that are presently available; I have reviewed the patients medication list; I have reviewed the consulting providers response and recommendations. I have reviewed or attempted to review medical records based upon their availability    All of the patient's questions have been  addressed and answered. Patient's is agreeable to the above stated plan. I will continue to monitor closely and make adjustments to medical management as needed.  _____________________________________________________________________    Radiology:  US Retroperitoneal Complete  Narrative: EXAMINATION:  US RETROPERITONEAL COMPLETE    CLINICAL HISTORY:  Urinary Obstruction;    TECHNIQUE:  Ultrasound of the kidneys and urinary bladder was performed including color flow and grayscale evaluation of the kidneys.    COMPARISON:  CT lumbar spine 02/08/2023    FINDINGS:  LIMITATIONS: Exam limited due to poor acoustic window related to shadowing bowel gas and/or body habitus.    RIGHT KIDNEY: Lower pole is obscured by shadowing.  The right kidney measures 9 cm.  No hydronephrosis.  Incidental 1 cm cyst at the upper pole.  Size likely underestimated based on appearance on recent CT exam.  No follow-up imaging is recommended per consensus recommendations based on imaging criteria.   No appreciable shadowing renal calculus.    LEFT KIDNEY: The left kidney measures 8.4 cm. No hydronephrosis.No cystic renal mass. Left nephrolithiasis.  Stones measure up to 5 mm upon review of CT exam.    BLADDER: Collapsed about a Mcfadden.    OTHER: Aorta and IVC are obscured by shadowing gas.  Impression: No  hydronephrosis    Nonobstructing left nephrolithiasis    Electronically signed by: Latasha Barba  Date:    02/11/2023  Time:    13:16      Rosas Gracia MD   02/11/2023

## 2023-02-11 NOTE — ED NOTES
Pt attempting to get out of ER stretcher from end of bed with both side rails raised after being educated on need to stay in bed. Pt more confused and agitated. Pt placed in roll belt for safety. Bed in lowest position, side rails raised, non-slip socks on pt. Room curtain remains open for direct visualization of pt.

## 2023-02-12 LAB
ANION GAP SERPL CALC-SCNC: 11 MEQ/L
AV INDEX (PROSTH): 0.66
AV MEAN GRADIENT: 3 MMHG
AV PEAK GRADIENT: 5 MMHG
AV VALVE AREA: 2.06 CM2
AV VELOCITY RATIO: 0.69
BSA FOR ECHO PROCEDURE: 1.83 M2
BUN SERPL-MCNC: 36.3 MG/DL (ref 8.4–25.7)
CALCIUM SERPL-MCNC: 8.7 MG/DL (ref 8.8–10)
CHLORIDE SERPL-SCNC: 113 MMOL/L (ref 98–107)
CO2 SERPL-SCNC: 18 MMOL/L (ref 23–31)
CREAT SERPL-MCNC: 1.69 MG/DL (ref 0.73–1.18)
CREAT/UREA NIT SERPL: 21
CV ECHO LV RWT: 0.34 CM
DOP CALC AO PEAK VEL: 1.07 M/S
DOP CALC AO VTI: 27.6 CM
DOP CALC LVOT AREA: 3.1 CM2
DOP CALC LVOT DIAMETER: 2 CM
DOP CALC LVOT PEAK VEL: 0.74 M/S
DOP CALC LVOT STROKE VOLUME: 56.83 CM3
DOP CALC MV VTI: 33.9 CM
DOP CALCLVOT PEAK VEL VTI: 18.1 CM
E WAVE DECELERATION TIME: 215 MSEC
E/A RATIO: 1.4
E/E' RATIO: 7.7 M/S
ECHO LV POSTERIOR WALL: 0.85 CM (ref 0.6–1.1)
EJECTION FRACTION: 60 %
ERYTHROCYTE [DISTWIDTH] IN BLOOD BY AUTOMATED COUNT: 11.9 % (ref 11.5–17)
FRACTIONAL SHORTENING: 26 % (ref 28–44)
GFR SERPLBLD CREATININE-BSD FMLA CKD-EPI: 40 MLS/MIN/1.73/M2
GLUCOSE SERPL-MCNC: 99 MG/DL (ref 82–115)
HCT VFR BLD AUTO: 29.7 % (ref 42–52)
HGB BLD-MCNC: 10.2 GM/DL (ref 14–18)
INTERVENTRICULAR SEPTUM: 0.79 CM (ref 0.6–1.1)
LEFT ATRIUM SIZE: 4 CM
LEFT ATRIUM VOLUME INDEX MOD: 27.1 ML/M2
LEFT ATRIUM VOLUME MOD: 49.6 CM3
LEFT INTERNAL DIMENSION IN SYSTOLE: 3.69 CM (ref 2.1–4)
LEFT VENTRICLE DIASTOLIC VOLUME INDEX: 64.48 ML/M2
LEFT VENTRICLE DIASTOLIC VOLUME: 118 ML
LEFT VENTRICLE MASS INDEX: 76 G/M2
LEFT VENTRICLE SYSTOLIC VOLUME INDEX: 31.6 ML/M2
LEFT VENTRICLE SYSTOLIC VOLUME: 57.8 ML
LEFT VENTRICULAR INTERNAL DIMENSION IN DIASTOLE: 4.99 CM (ref 3.5–6)
LEFT VENTRICULAR MASS: 139.7 G
LV LATERAL E/E' RATIO: 7.7 M/S
LV SEPTAL E/E' RATIO: 7.7 M/S
LVOT MG: 1 MMHG
LVOT MV: 0.48 CM/S
MAGNESIUM SERPL-MCNC: 1.9 MG/DL (ref 1.6–2.6)
MCH RBC QN AUTO: 32.5 PG
MCHC RBC AUTO-ENTMCNC: 34.3 MG/DL (ref 33–36)
MCV RBC AUTO: 94.6 FL (ref 80–94)
MV MEAN GRADIENT: 1 MMHG
MV PEAK A VEL: 0.55 M/S
MV PEAK E VEL: 0.77 M/S
MV PEAK GRADIENT: 3 MMHG
MV STENOSIS PRESSURE HALF TIME: 63 MS
MV VALVE AREA BY CONTINUITY EQUATION: 1.68 CM2
MV VALVE AREA P 1/2 METHOD: 3.49 CM2
NRBC BLD AUTO-RTO: 0 %
PHOSPHATE SERPL-MCNC: 3.6 MG/DL (ref 2.3–4.7)
PISA TR MAX VEL: 2.84 M/S
PLATELET # BLD AUTO: 164 X10(3)/MCL (ref 130–400)
PMV BLD AUTO: 9.7 FL (ref 7.4–10.4)
POTASSIUM SERPL-SCNC: 4.4 MMOL/L (ref 3.5–5.1)
PV PEAK VELOCITY: 1.91 CM/S
RA PRESSURE: 8 MMHG
RBC # BLD AUTO: 3.14 X10(6)/MCL (ref 4.7–6.1)
SODIUM SERPL-SCNC: 142 MMOL/L (ref 136–145)
TDI LATERAL: 0.1 M/S
TDI SEPTAL: 0.1 M/S
TDI: 0.1 M/S
TR MAX PG: 32 MMHG
TRICUSPID ANNULAR PLANE SYSTOLIC EXCURSION: 1.96 CM
TV REST PULMONARY ARTERY PRESSURE: 40 MMHG
WBC # SPEC AUTO: 5.9 X10(3)/MCL (ref 4.5–11.5)

## 2023-02-12 PROCEDURE — 11000001 HC ACUTE MED/SURG PRIVATE ROOM

## 2023-02-12 PROCEDURE — 80048 BASIC METABOLIC PNL TOTAL CA: CPT | Performed by: NURSE PRACTITIONER

## 2023-02-12 PROCEDURE — 0240U COVID/FLU A&B PCR: CPT | Performed by: INTERNAL MEDICINE

## 2023-02-12 PROCEDURE — 84100 ASSAY OF PHOSPHORUS: CPT | Performed by: NURSE PRACTITIONER

## 2023-02-12 PROCEDURE — 51702 INSERT TEMP BLADDER CATH: CPT

## 2023-02-12 PROCEDURE — 63600175 PHARM REV CODE 636 W HCPCS: Performed by: INTERNAL MEDICINE

## 2023-02-12 PROCEDURE — 83735 ASSAY OF MAGNESIUM: CPT | Performed by: NURSE PRACTITIONER

## 2023-02-12 PROCEDURE — 63600175 PHARM REV CODE 636 W HCPCS: Performed by: STUDENT IN AN ORGANIZED HEALTH CARE EDUCATION/TRAINING PROGRAM

## 2023-02-12 PROCEDURE — 85027 COMPLETE CBC AUTOMATED: CPT | Performed by: NURSE PRACTITIONER

## 2023-02-12 PROCEDURE — 25000003 PHARM REV CODE 250: Performed by: NURSE PRACTITIONER

## 2023-02-12 PROCEDURE — 25000003 PHARM REV CODE 250: Performed by: INTERNAL MEDICINE

## 2023-02-12 RX ORDER — SODIUM CHLORIDE, SODIUM LACTATE, POTASSIUM CHLORIDE, CALCIUM CHLORIDE 600; 310; 30; 20 MG/100ML; MG/100ML; MG/100ML; MG/100ML
INJECTION, SOLUTION INTRAVENOUS CONTINUOUS
Status: DISCONTINUED | OUTPATIENT
Start: 2023-02-12 | End: 2023-02-13

## 2023-02-12 RX ADMIN — QUETIAPINE FUMARATE 25 MG: 25 TABLET ORAL at 08:02

## 2023-02-12 RX ADMIN — ENOXAPARIN SODIUM 30 MG: 30 INJECTION SUBCUTANEOUS at 05:02

## 2023-02-12 RX ADMIN — SERTRALINE HYDROCHLORIDE 50 MG: 50 TABLET ORAL at 08:02

## 2023-02-12 RX ADMIN — Medication 6 MG: at 08:02

## 2023-02-12 RX ADMIN — MEMANTINE 10 MG: 5 TABLET ORAL at 08:02

## 2023-02-12 RX ADMIN — Medication 6 MG: at 12:02

## 2023-02-12 RX ADMIN — POLYETHYLENE GLYCOL 3350 17 G: 17 POWDER, FOR SOLUTION ORAL at 10:02

## 2023-02-12 RX ADMIN — TAMSULOSIN HYDROCHLORIDE 0.4 MG: 0.4 CAPSULE ORAL at 08:02

## 2023-02-12 RX ADMIN — SENNOSIDES AND DOCUSATE SODIUM 2 TABLET: 50; 8.6 TABLET ORAL at 05:02

## 2023-02-12 RX ADMIN — ACETAMINOPHEN 1000 MG: 500 TABLET, FILM COATED ORAL at 08:02

## 2023-02-12 RX ADMIN — SODIUM CHLORIDE, POTASSIUM CHLORIDE, SODIUM LACTATE AND CALCIUM CHLORIDE: 600; 310; 30; 20 INJECTION, SOLUTION INTRAVENOUS at 03:02

## 2023-02-12 RX ADMIN — ACETAMINOPHEN 1000 MG: 500 TABLET, FILM COATED ORAL at 02:02

## 2023-02-12 RX ADMIN — FINASTERIDE 5 MG: 5 TABLET, FILM COATED ORAL at 08:02

## 2023-02-12 RX ADMIN — SODIUM CHLORIDE, POTASSIUM CHLORIDE, SODIUM LACTATE AND CALCIUM CHLORIDE: 600; 310; 30; 20 INJECTION, SOLUTION INTRAVENOUS at 10:02

## 2023-02-12 RX ADMIN — THERA TABS 1 TABLET: TAB at 08:02

## 2023-02-12 NOTE — NURSING
Contacted NP on call ROSMERY Barriga, about daughter concerns that pt had a stroke due to jerk like movements and pt gaze into the lights for a few seconds. An assessment was completed, vss except o2 was decreased. O2 2L NC and labs were ordered

## 2023-02-12 NOTE — PROGRESS NOTES
[facility]  Hospital Medicine Progress Note        Chief Complaint: Inpatient Follow-up for L Hip Pain s/p Fall; L1 Fracture    HPI:   Mr Collado is a 82-year-old gentleman with Dementia and recently diagnosed L1 Compression Fracture on 02/08/22 presents to the ED with complaints of left hip pain after sustaining another ground level fall yesterday evening. Pt daughter at bedside, gives report. Pt lives with wife and is independent of functional ADLs. Pt wife was present during initial fall and there was no head trauma or LOC however details of fall yesterday are unclear. Pt with baseline bradycardia per daughter, stating that he runs 1 miles daily up until a few days ago.      Initial VS showed Temperature 97.3°, heart rate 64, respirations 18, blood pressure 149/77.  While in the ED patient with HR in the 50s.  X-ray of the hip was negative for acute fractures.  Due to his repeated falls he is being admitted to the hospitalist service for PT evaluation & Rehab placement. He also had an mild TEJAS. PT/OT consulted.  Patient noted to have 900 mL in bladder after he had urge to pee and could not void.  Placing Mcfadden and will consult Urology. Echocardiogram and Carotid U/S ordered and pending; will follow up on report.  BUN/creatinine noted to be 37.9/1.67, continued on IV 0.45% NS at 100 mL/hr.    Interval Hx:   Today, Mr Collado was seen at bedside; he stated he was doing well and did not have any new complaints.  Noted to be more awake today.  Daughter reported increased confusion and hallucinations since yesterday afternoon told this morning which is thought to be secondary to delirium which was explained to her.  Urology on board, following recommendations.  Echocardiogram showing EF 60%, PASP 40 mmHg.  U/S B/L carotids was unremarkable.  Will change IVF to IV LR to prevent further worsening on NAGMA which is likely from fluids.  BUN/creatinine noted to be at 36.3/1.69 which may be  baseline.  Strictly no benzodiazepines or antihistamines to be used for fear of worsening delirium, patient is in mittens and can be restrained as needed.  Use redirection as much as possible for delirium.  IV Haldol if necessary for agitation.    Objective/physical exam:  General: In no acute distress, afebrile  Chest: Clear to auscultation bilaterally  Heart: RRR, +S1, S2, no appreciable murmur  Abdomen: Soft, nontender, BS +  MSK: Warm, no lower extremity edema, no clubbing or cyanosis  Neurologic: Alert and oriented x4, Cranial nerve II-XII intact, Strength 5/5 in all 4 extremities    VITAL SIGNS: 24 HRS MIN & MAX LAST   Temp  Min: 97.9 °F (36.6 °C)  Max: 98.2 °F (36.8 °C) 98.2 °F (36.8 °C)   BP  Min: 126/74  Max: 144/64 126/74     Pulse  Min: 55  Max: 86  64     Resp  Min: 17  Max: 19 18   SpO2  Min: 88 %  Max: 97 % 95 %         Recent Labs   Lab 02/10/23  1235 02/11/23  0512 02/12/23  0211   WBC 5.6 5.8 5.9   RBC 3.71* 3.27* 3.14*   HGB 12.0* 10.4* 10.2*   HCT 35.6* 31.8* 29.7*   MCV 96.0* 97.2* 94.6*   MCH 32.3 31.8 32.5   MCHC 33.7 32.7* 34.3   RDW 12.1 12.0 11.9    152 164   MPV 10.0 9.9 9.7         Recent Labs   Lab 02/10/23  1235 02/11/23  0512 02/12/23  0211    140 142   K 4.2 4.2 4.4   CO2 21* 22* 18*   BUN 45.0* 37.8* 36.3*   CREATININE 1.84* 1.67* 1.69*   CALCIUM 9.0 8.3* 8.7*   MG  --  1.90 1.90   ALBUMIN 3.4  --   --    ALKPHOS 55  --   --    ALT 19  --   --    AST 28  --   --    BILITOT 0.7  --   --        Scheduled Med:   enoxaparin  30 mg Subcutaneous Daily    finasteride  5 mg Oral Daily    memantine  10 mg Oral BID    multivitamin  1 tablet Oral Daily    QUEtiapine  25 mg Oral QHS    sertraline  50 mg Oral QHS    tamsulosin  1 capsule Oral BID      Continuous Infusions:   sodium chloride 0.45% 100 mL/hr at 02/11/23 2040      PRN Meds:  acetaminophen, acetaminophen, aluminum-magnesium hydroxide-simethicone, haloperidol lactate, hydrALAZINE, HYDROcodone-acetaminophen, melatonin,  morphine, ondansetron, polyethylene glycol, prochlorperazine, senna-docusate 8.6-50 mg, simethicone, sodium chloride 0.9%     Assessment/Plan:  Recurrent Falls without LOC 2/2 Presyncope vs Dehydration  Bilateral Hip Pain with Decreased Mobility  L1 Compression Fracture (Dx on 02/08)  TEJAS 2/2 Intravascular Depletion vs Post-Obstruction  Bradycardia  AMS 2/2 Delirium +/- Dementia  Normocytic Anemia  Urine Retention with TEJAS    Patient continues to be admitted for close monitoring   But continue q.4 hours neuro checks   Echocardiogram, Carotid U/S unremarkable   Patient continued on IV LR at 75 mL/hr for TEJAS   PT/OT consulted; will follow recommendations for placement   Patient continued on finasteride 5 mg, tamsulosin 0.4 mg, memantine 10 mg, quetiapine 25 mg, sertraline 50 mg  Inserted Mcfadden catheter and consulted Urology; following recommendations  Continue monitoring patient's symptoms    VTE prophylaxis:  Lovenox    Patient condition:  Stable    Anticipated discharge and Disposition:     Pending    All diagnosis and differential diagnosis have been reviewed; assessment and plan has been documented; I have personally reviewed the labs and test results that are presently available; I have reviewed the patients medication list; I have reviewed the consulting providers response and recommendations. I have reviewed or attempted to review medical records based upon their availability    All of the patient's questions have been  addressed and answered. Patient's is agreeable to the above stated plan. I will continue to monitor closely and make adjustments to medical management as needed.  _____________________________________________________________________    Radiology:  Echo  · Normal systolic function.  · The estimated ejection fraction is 60%.  · Normal left ventricular diastolic function.  · Normal right ventricular size with normal right ventricular systolic   function.  · Mild tricuspid regurgitation.  ·  Intermediate central venous pressure (8 mmHg).  · The estimated PA systolic pressure is 40 mmHg.     X-Ray Chest 1 View  Narrative: EXAMINATION:  XR CHEST 1 VIEW    CLINICAL HISTORY:  hypoxemia;    TECHNIQUE:  Single frontal view of the chest was performed.    COMPARISON:  None    FINDINGS:  LINES AND TUBES: EKG/telemetry leads overlie the chest.    MEDIASTINUM AND DALE: The cardiac silhouette is normal. Aortic atherosclerosis.    LUNGS: Probable atelectasis at the left mid lung zone.    PLEURA:No pleural effusion. No pneumothorax.    OTHER: No acute osseous abnormality.  Impression: No acute cardiopulmonary abnormality.    Electronically signed by: Latasha Barba  Date:    02/12/2023  Time:    10:29      Rosas Gracia MD   02/12/2023

## 2023-02-13 LAB
FLUAV AG UPPER RESP QL IA.RAPID: NOT DETECTED
FLUBV AG UPPER RESP QL IA.RAPID: NOT DETECTED
LEFT CCA DIST DIAS: 12 CM/S
LEFT CCA DIST SYS: 76 CM/S
LEFT CCA PROX DIAS: 9 CM/S
LEFT CCA PROX SYS: 98 CM/S
LEFT ECA DIAS: 4 CM/S
LEFT ECA SYS: 68 CM/S
LEFT ICA DIST DIAS: 12 CM/S
LEFT ICA DIST SYS: 46 CM/S
LEFT ICA MID DIAS: 12 CM/S
LEFT ICA MID SYS: 50 CM/S
LEFT ICA PROX DIAS: 7 CM/S
LEFT ICA PROX SYS: 43 CM/S
LEFT VERTEBRAL DIAS: 8 CM/S
LEFT VERTEBRAL SYS: 64 CM/S
OHS CV CAROTID RIGHT ICA EDV HIGHEST: 16
OHS CV CAROTID ULTRASOUND LEFT ICA/CCA RATIO: 0.66
OHS CV CAROTID ULTRASOUND RIGHT ICA/CCA RATIO: 0.84
OHS CV PV CAROTID LEFT HIGHEST CCA: 98
OHS CV PV CAROTID LEFT HIGHEST ICA: 50
OHS CV PV CAROTID RIGHT HIGHEST CCA: 101
OHS CV PV CAROTID RIGHT HIGHEST ICA: 73
OHS CV US CAROTID LEFT HIGHEST EDV: 12
RIGHT CCA DIST DIAS: 5 CM/S
RIGHT CCA DIST SYS: 87 CM/S
RIGHT CCA PROX DIAS: 0 CM/S
RIGHT CCA PROX SYS: 101 CM/S
RIGHT ECA DIAS: 0 CM/S
RIGHT ECA SYS: 116 CM/S
RIGHT ICA DIST DIAS: 16 CM/S
RIGHT ICA DIST SYS: 73 CM/S
RIGHT ICA MID DIAS: 12 CM/S
RIGHT ICA MID SYS: 64 CM/S
RIGHT ICA PROX DIAS: 5 CM/S
RIGHT ICA PROX SYS: 60 CM/S
RIGHT VERTEBRAL SYS: 41 CM/S
SARS-COV-2 RNA RESP QL NAA+PROBE: DETECTED

## 2023-02-13 PROCEDURE — 97116 GAIT TRAINING THERAPY: CPT | Mod: CQ

## 2023-02-13 PROCEDURE — 11000001 HC ACUTE MED/SURG PRIVATE ROOM

## 2023-02-13 PROCEDURE — 27000221 HC OXYGEN, UP TO 24 HOURS

## 2023-02-13 PROCEDURE — 25000003 PHARM REV CODE 250: Performed by: NURSE PRACTITIONER

## 2023-02-13 PROCEDURE — 63600175 PHARM REV CODE 636 W HCPCS: Performed by: INTERNAL MEDICINE

## 2023-02-13 PROCEDURE — 97530 THERAPEUTIC ACTIVITIES: CPT | Mod: CQ

## 2023-02-13 PROCEDURE — 94761 N-INVAS EAR/PLS OXIMETRY MLT: CPT

## 2023-02-13 PROCEDURE — 25000003 PHARM REV CODE 250: Performed by: INTERNAL MEDICINE

## 2023-02-13 PROCEDURE — 27000207 HC ISOLATION

## 2023-02-13 RX ORDER — TRAMADOL HYDROCHLORIDE 50 MG/1
50 TABLET ORAL EVERY 6 HOURS PRN
Status: DISCONTINUED | OUTPATIENT
Start: 2023-02-13 | End: 2023-02-23 | Stop reason: HOSPADM

## 2023-02-13 RX ORDER — ACETAMINOPHEN 500 MG
1000 TABLET ORAL 3 TIMES DAILY
Status: DISCONTINUED | OUTPATIENT
Start: 2023-02-13 | End: 2023-02-23 | Stop reason: HOSPADM

## 2023-02-13 RX ORDER — AMOXICILLIN 250 MG
2 CAPSULE ORAL NIGHTLY
Status: DISCONTINUED | OUTPATIENT
Start: 2023-02-13 | End: 2023-02-22

## 2023-02-13 RX ORDER — QUETIAPINE FUMARATE 25 MG/1
50 TABLET, FILM COATED ORAL
Status: DISCONTINUED | OUTPATIENT
Start: 2023-02-13 | End: 2023-02-17

## 2023-02-13 RX ORDER — TALC
3 POWDER (GRAM) TOPICAL NIGHTLY
Status: DISCONTINUED | OUTPATIENT
Start: 2023-02-13 | End: 2023-02-13

## 2023-02-13 RX ORDER — BENZONATATE 100 MG/1
200 CAPSULE ORAL 3 TIMES DAILY PRN
Status: DISCONTINUED | OUTPATIENT
Start: 2023-02-13 | End: 2023-02-23 | Stop reason: HOSPADM

## 2023-02-13 RX ORDER — SERTRALINE HYDROCHLORIDE 50 MG/1
50 TABLET, FILM COATED ORAL DAILY
Status: DISCONTINUED | OUTPATIENT
Start: 2023-02-14 | End: 2023-02-23 | Stop reason: HOSPADM

## 2023-02-13 RX ORDER — QUETIAPINE FUMARATE 25 MG/1
50 TABLET, FILM COATED ORAL NIGHTLY
Status: DISCONTINUED | OUTPATIENT
Start: 2023-02-13 | End: 2023-02-13

## 2023-02-13 RX ORDER — TALC
6 POWDER (GRAM) TOPICAL NIGHTLY
Status: DISCONTINUED | OUTPATIENT
Start: 2023-02-13 | End: 2023-02-21

## 2023-02-13 RX ORDER — ALBUTEROL SULFATE 90 UG/1
2 AEROSOL, METERED RESPIRATORY (INHALATION) EVERY 4 HOURS PRN
Status: DISCONTINUED | OUTPATIENT
Start: 2023-02-13 | End: 2023-02-23 | Stop reason: HOSPADM

## 2023-02-13 RX ADMIN — SENNOSIDES AND DOCUSATE SODIUM 2 TABLET: 50; 8.6 TABLET ORAL at 10:02

## 2023-02-13 RX ADMIN — FINASTERIDE 5 MG: 5 TABLET, FILM COATED ORAL at 10:02

## 2023-02-13 RX ADMIN — ENOXAPARIN SODIUM 30 MG: 30 INJECTION SUBCUTANEOUS at 05:02

## 2023-02-13 RX ADMIN — ACETAMINOPHEN 1000 MG: 500 TABLET, FILM COATED ORAL at 05:02

## 2023-02-13 RX ADMIN — QUETIAPINE FUMARATE 50 MG: 25 TABLET ORAL at 08:02

## 2023-02-13 RX ADMIN — TAMSULOSIN HYDROCHLORIDE 0.4 MG: 0.4 CAPSULE ORAL at 08:02

## 2023-02-13 RX ADMIN — POLYETHYLENE GLYCOL 3350 17 G: 17 POWDER, FOR SOLUTION ORAL at 05:02

## 2023-02-13 RX ADMIN — THERA TABS 1 TABLET: TAB at 10:02

## 2023-02-13 RX ADMIN — TAMSULOSIN HYDROCHLORIDE 0.4 MG: 0.4 CAPSULE ORAL at 10:02

## 2023-02-13 RX ADMIN — TRAMADOL HYDROCHLORIDE 50 MG: 50 TABLET, COATED ORAL at 08:02

## 2023-02-13 RX ADMIN — Medication 6 MG: at 08:02

## 2023-02-13 RX ADMIN — MEMANTINE 10 MG: 5 TABLET ORAL at 10:02

## 2023-02-13 RX ADMIN — SENNOSIDES AND DOCUSATE SODIUM 2 TABLET: 50; 8.6 TABLET ORAL at 08:02

## 2023-02-13 RX ADMIN — MEMANTINE 10 MG: 5 TABLET ORAL at 08:02

## 2023-02-13 RX ADMIN — ACETAMINOPHEN 1000 MG: 500 TABLET, FILM COATED ORAL at 08:02

## 2023-02-13 RX ADMIN — ACETAMINOPHEN 1000 MG: 500 TABLET, FILM COATED ORAL at 10:02

## 2023-02-13 NOTE — PROGRESS NOTES
[facility]  HOSPITAL MEDICINE  PROGRESS NOTE        CHIEF COMPLAINT   Hospital follow up    HOSPITAL COURSE   Mr Collado is a 82-year-old gentleman with Dementia and recently diagnosed L1 Compression Fracture on 02/08/22 presents to the ED with complaints of left hip pain after sustaining another ground level fall yesterday evening. Pt daughter at bedside, gives report. Pt lives with wife and is independent of functional ADLs. Pt wife was present during initial fall and there was no head trauma or LOC however details of fall yesterday are unclear. Pt with baseline bradycardia per daughter, stating that he runs 1 miles daily up until a few days ago.      Initial VS showed Temperature 97.3°, heart rate 64, respirations 18, blood pressure 149/77.  While in the ED patient with HR in the 50s.  X-ray of the hip was negative for acute fractures.  Due to his repeated falls he is being admitted to the hospitalist service for PT evaluation & Rehab placement. He also had an mild TEJAS. PT/OT consulted.  Patient noted to have 900 mL in bladder after he had urge to pee and could not void.  Placing Mcfadden and will consult Urology. Echocardiogram and Carotid U/S ordered and pending; will follow up on report.  BUN/creatinine noted to be 37.9/1.67, continued on IV 0.45% NS at 100 mL/hr.    Today  Seen examined this morning.  Quite confused and wanting to take his mittens off, remains in a roll belt.        OBJECTIVE/PHYSICAL EXAM     VITAL SIGNS (MOST RECENT):  Temp: 99.2 °F (37.3 °C) (02/13/23 0841)  Pulse: 69 (02/13/23 0841)  Resp: 18 (02/13/23 0400)  BP: (!) 143/68 (02/13/23 0841)  SpO2: 100 % (02/13/23 0400)   VITAL SIGNS (24 HOUR RANGE):  Temp:  [98.1 °F (36.7 °C)-99.2 °F (37.3 °C)] 99.2 °F (37.3 °C)  Pulse:  [55-73] 69  Resp:  [18] 18  SpO2:  [95 %-100 %] 100 %  BP: (126-144)/(63-85) 143/68   GENERAL: In no acute distress, afebrile  HEENT:  CHEST: Clear to auscultation bilaterally  HEART: S1, S2, no  appreciable murmur  ABDOMEN: Soft, nontender, BS +  MSK: Warm, no lower extremity edema, no clubbing or cyanosis  NEUROLOGIC:  Alert but very confused, moving all extremities with good strength  INTEGUMENTARY:  PSYCHIATRY:        ASSESSMENT/PLAN   Recurrent falls  Bilateral hip pain/osteoarthritis   L1 compression fracture  Acute kidney injury on CKD   COVID-19 infection   Acute urinary retention  Acute encephalopathy/delirium on dementia      Continue Flomax and finasteride.  Indwelling Mcfadden catheter with voiding trials in about a week.    Discontinue IV fluids   Continue low-dose Seroquel at nighttime and we orientation techniques, melatonin at nighttime  Start scheduled Tylenol and tramadol as needed  Supportive care for COVID infection.  Continue PT and OT    DVT prophylaxis:  Lovenox 30    Anticipated discharge and disposition:   __________________________________________________________________________    LABS/MICRO/MEDS/DIAGNOSTICS       LABS  Recent Labs     02/10/23  1235 02/11/23  0512 02/12/23  0211      < > 142   K 4.2   < > 4.4   CHLORIDE 109*   < > 113*   CO2 21*   < > 18*   BUN 45.0*   < > 36.3*   CREATININE 1.84*   < > 1.69*   GLUCOSE 94   < > 99   CALCIUM 9.0   < > 8.7*   ALKPHOS 55  --   --    AST 28  --   --    ALT 19  --   --    ALBUMIN 3.4  --   --     < > = values in this interval not displayed.     Recent Labs     02/12/23  0211   WBC 5.9   RBC 3.14*   HCT 29.7*   MCV 94.6*          MICROBIOLOGY  Microbiology Results (last 7 days)       ** No results found for the last 168 hours. **               MEDICATIONS   enoxaparin  30 mg Subcutaneous Daily    finasteride  5 mg Oral Daily    memantine  10 mg Oral BID    multivitamin  1 tablet Oral Daily    QUEtiapine  25 mg Oral QHS    sertraline  50 mg Oral QHS    tamsulosin  1 capsule Oral BID         INFUSIONS   lactated ringers 75 mL/hr at 02/12/23 2097          DIAGNOSTIC TESTS  X-Ray Chest 1 View   Final Result      No acute  cardiopulmonary process.         Electronically signed by: Roque Ma   Date:    02/13/2023   Time:    07:14      X-Ray Chest 1 View   Final Result      No acute cardiopulmonary abnormality.         Electronically signed by: Latasha Barba   Date:    02/12/2023   Time:    10:29      US Retroperitoneal Complete   Final Result      No hydronephrosis      Nonobstructing left nephrolithiasis         Electronically signed by: Latasha Barba   Date:    02/11/2023   Time:    13:16      CT Head Without Contrast   Final Result      Chronic age-related changes.  No acute process         Electronically signed by: Lavinia Jason   Date:    02/10/2023   Time:    17:25      CT Cervical Spine Without Contrast   Final Result      Degenerative changes in the cervical spine      No evidence of acute trauma         Electronically signed by: Lavinia Jason   Date:    02/10/2023   Time:    17:28      X-Ray Hip 2 or 3 views Left (with Pelvis when performed)   Final Result      No acute osseous abnormality.         Electronically signed by: Latasha Barba   Date:    02/10/2023   Time:    12:28           EF   Date Value Ref Range Status   02/11/2023 60 % Final              Case related differential diagnoses have been reviewed; assessment and plan has been documented. I have personally reviewed the labs and test results that are currently available; I have reviewed the patients medication list. I have reviewed the consulting providers recommendations. I have reviewed or attempted to review medical records based upon their availability.  All of the patient's and/or family's questions have been addressed and answered to the best of my ability.  I will continue to monitor closely and make adjustments to medical management as needed.  This document was created using M*Modal Fluency Direct.  Transcription errors may have been made.  Please contact me if any questions may rise regarding documentation to clarify transcription.         Alexis Foy MD   02/13/2023   Internal Medicine

## 2023-02-13 NOTE — NURSING
"Pt daughter wanted a covid test order due to pt "wet cough", upset a covid test wasn't completed upon arrival. Swabbed pt, pt tested positive. NP Consuelo ordered Viktoriya pearls, Proair inhaler, & CXR  "

## 2023-02-13 NOTE — PLAN OF CARE
02/13/23 1309   Discharge Assessment   Assessment Type Discharge Planning Assessment   Confirmed/corrected address, phone number and insurance Yes   Confirmed Demographics Correct on Facesheet   Source of Information family   People in Home spouse   Do you expect to return to your current living situation? Yes   Do you have help at home or someone to help you manage your care at home? Yes   Home Accessibility stairs to enter home   Number of Stairs, Main Entrance one   Home Layout Able to live on 1st floor   Equipment Currently Used at Home none   Readmission within 30 days? No   Patient currently being followed by outpatient case management? No   Do you currently have service(s) that help you manage your care at home? Yes   Name and Contact number of agency [facility]   Do you take prescription medications? Yes   Do you have prescription coverage? Yes   Do you have any problems affording any of your prescribed medications? No   Is the patient taking medications as prescribed? yes   How do you get to doctors appointments? family or friend will provide   Are you on dialysis? No   Do you take coumadin? No   Discharge Plan A Skilled Nursing Facility   Discharge Plan B Home Health   DME Needed Upon Discharge  none   Discharge Plan discussed with: Adult children;Spouse/sig other   Discharge Barriers Identified None     Assessment done with patient's daughter due to confusion. Patient lives with spouse in a single story home. Patient is active with [facility]. Denies any DME in home. Patient spouse has feelings of being overwhelmed with patient care. Discussed possible SNF placement. Will meet with family to discuss and notify CM on decision.

## 2023-02-13 NOTE — PT/OT/SLP PROGRESS
Physical Therapy Treatment- Stroke    Patient Name:  John Collado   MRN:  [id number]    Recommendations:     Discharge Recommendations: home health PT, other (see comments) (HH vs [facility]DC placement per family)  Discharge Equipment Recommendations: walker, rolling  Barriers to discharge: Decreased caregiver support    Assessment:     John Collado is a 82 y.o. male admitted with a medical diagnosis of Fall.  He presents with the following impairments/functional limitations: weakness, impaired endurance, impaired functional mobility, gait instability, impaired balance, impaired cognition.    Rehab Prognosis: Good; patient would benefit from acute skilled PT services to address these deficits and reach maximum level of function.    Recent Surgery: * No surgery found *      Plan:     During this hospitalization, patient to be seen 6 x/week to address the identified rehab impairments via gait training, therapeutic activities, therapeutic exercises and progress toward the following goals:    Plan of Care Expires:       Subjective     Chief Complaint: None  Patient/Family Comments/goals:   Pain/Comfort:         Objective:     Pt found in bed upon entry with family in room. 1:1 present and pt found with mitts and roll belt.     General Precautions: Standard,    Orthopedic Precautions:    Braces: TLSO  Respiratory Status: Nasal cannula, flow 2 L/min     Functional Mobility:  Bed Mobility:     Supine to Sit: minimum assistance  Sit to Supine: minimum assistance  Gait: Min A   Pt amb 89ft X 2 with step through gait pattern. Pt presents with fast jessica and amb with RW too far ahead of self. VC's to stay in close proximity to RW. Pt presents with decreased safety awareness.     Patient left HOB elevated with call button in reach, restraints reapplied at end of session, and 1:1 present..    GOALS:   Multidisciplinary Problems       Physical Therapy Goals          Problem: Physical Therapy    Goal Priority Disciplines  Outcome Goal Variances Interventions   Physical Therapy Goal     PT, PT/OT Ongoing, Progressing     Description: Goals to be met by: 2023     Patient will increase functional independence with mobility by performin. Supine to sit with Modified [place]  2. Sit to supine with Modified [place]  3. Bed to chair transfer with Modified [place] using No Assistive Device  4. Gait  x 400 feet with [place] using No Assistive Device.                          Time Tracking:     PT Received On: 23  PT Start Time: 0849     PT Stop Time: 15  PT Total Time (min): 26 min     Billable Minutes: Gait Training 10 and Therapeutic Activity 16    Treatment Type: Treatment  PT/PTA: PTA     PTA Visit Number: 1     2023

## 2023-02-14 LAB
ANION GAP SERPL CALC-SCNC: 11 MEQ/L
BUN SERPL-MCNC: 23 MG/DL (ref 8.4–25.7)
CALCIUM SERPL-MCNC: 8.5 MG/DL (ref 8.8–10)
CHLORIDE SERPL-SCNC: 112 MMOL/L (ref 98–107)
CO2 SERPL-SCNC: 21 MMOL/L (ref 23–31)
CREAT SERPL-MCNC: 1.29 MG/DL (ref 0.73–1.18)
CREAT/UREA NIT SERPL: 18
GFR SERPLBLD CREATININE-BSD FMLA CKD-EPI: 56 MLS/MIN/1.73/M2
GLUCOSE SERPL-MCNC: 80 MG/DL (ref 82–115)
POTASSIUM SERPL-SCNC: 4.1 MMOL/L (ref 3.5–5.1)
SODIUM SERPL-SCNC: 144 MMOL/L (ref 136–145)

## 2023-02-14 PROCEDURE — 97530 THERAPEUTIC ACTIVITIES: CPT | Mod: CQ

## 2023-02-14 PROCEDURE — 21400001 HC TELEMETRY ROOM

## 2023-02-14 PROCEDURE — 25000003 PHARM REV CODE 250: Performed by: INTERNAL MEDICINE

## 2023-02-14 PROCEDURE — 63600175 PHARM REV CODE 636 W HCPCS: Performed by: INTERNAL MEDICINE

## 2023-02-14 PROCEDURE — 25000003 PHARM REV CODE 250: Performed by: NURSE PRACTITIONER

## 2023-02-14 PROCEDURE — 80048 BASIC METABOLIC PNL TOTAL CA: CPT | Performed by: INTERNAL MEDICINE

## 2023-02-14 PROCEDURE — 97116 GAIT TRAINING THERAPY: CPT | Mod: CQ

## 2023-02-14 PROCEDURE — 11000001 HC ACUTE MED/SURG PRIVATE ROOM

## 2023-02-14 PROCEDURE — 27000207 HC ISOLATION

## 2023-02-14 PROCEDURE — 97166 OT EVAL MOD COMPLEX 45 MIN: CPT

## 2023-02-14 RX ORDER — ENOXAPARIN SODIUM 100 MG/ML
40 INJECTION SUBCUTANEOUS EVERY 24 HOURS
Status: DISCONTINUED | OUTPATIENT
Start: 2023-02-14 | End: 2023-02-23 | Stop reason: HOSPADM

## 2023-02-14 RX ADMIN — TAMSULOSIN HYDROCHLORIDE 0.4 MG: 0.4 CAPSULE ORAL at 08:02

## 2023-02-14 RX ADMIN — ENOXAPARIN SODIUM 40 MG: 40 INJECTION SUBCUTANEOUS at 04:02

## 2023-02-14 RX ADMIN — QUETIAPINE FUMARATE 50 MG: 25 TABLET ORAL at 08:02

## 2023-02-14 RX ADMIN — ACETAMINOPHEN 1000 MG: 500 TABLET, FILM COATED ORAL at 09:02

## 2023-02-14 RX ADMIN — TRAMADOL HYDROCHLORIDE 50 MG: 50 TABLET, COATED ORAL at 01:02

## 2023-02-14 RX ADMIN — FINASTERIDE 5 MG: 5 TABLET, FILM COATED ORAL at 08:02

## 2023-02-14 RX ADMIN — MEMANTINE 10 MG: 5 TABLET ORAL at 08:02

## 2023-02-14 RX ADMIN — Medication 6 MG: at 08:02

## 2023-02-14 RX ADMIN — ACETAMINOPHEN 1000 MG: 500 TABLET, FILM COATED ORAL at 04:02

## 2023-02-14 RX ADMIN — TRAMADOL HYDROCHLORIDE 50 MG: 50 TABLET, COATED ORAL at 04:02

## 2023-02-14 RX ADMIN — THERA TABS 1 TABLET: TAB at 08:02

## 2023-02-14 RX ADMIN — SERTRALINE HYDROCHLORIDE 50 MG: 50 TABLET ORAL at 08:02

## 2023-02-14 NOTE — PLAN OF CARE
Problem: Infection  Goal: Absence of Infection Signs and Symptoms  Outcome: Ongoing, Progressing     Problem: Adult Inpatient Plan of Care  Goal: Plan of Care Review  Outcome: Ongoing, Progressing  Goal: Patient-Specific Goal (Individualized)  Outcome: Ongoing, Progressing  Goal: Absence of Hospital-Acquired Illness or Injury  Outcome: Ongoing, Progressing  Goal: Optimal Comfort and Wellbeing  Outcome: Ongoing, Progressing  Goal: Readiness for Transition of Care  Outcome: Ongoing, Progressing     Problem: Skin Injury Risk Increased  Goal: Skin Health and Integrity  Outcome: Ongoing, Progressing     Problem: Fall Injury Risk  Goal: Absence of Fall and Fall-Related Injury  Outcome: Ongoing, Progressing     Problem: Pain Acute  Goal: Acceptable Pain Control and Functional Ability  Outcome: Ongoing, Progressing

## 2023-02-14 NOTE — PSYCH EVALUATION
"Subjective:    Patient is a 82 y.o. male. Back Pain (Unresolved Back pain after fall last Tuesday. C/o hip pain , reports another fall last night and landed on left hip, not on BT, denies hitting head, no LOC. Hx dementia, wife/BERKLEY/daughter at BS. Sitter at BS. Wife and family reports pt has been barely sleeping for the past 3 nights, he denies bad dreams. Has been actively hallucinating, even with this practitioner in the room. Has been waving arms and "talking to that man over there", flailing arms. Appetite was fair, wife reports he was "never a big eater". Retired /teacher. Wife reports she manages pt's medications as he is unable to remember/manage on her own. Wife admits he has been taking Zoloft/Melatonin before admission. Reports neurologist/PCP started Zoloft recently for "anxiety", denies previous psych hx otherwise. Family reports a possible pain management problem, as they notice some increased restlessness with pain. Questionable because when prompted, he usually denies "pain" when prompted. APAP given earlier in the afternoon, and family believes with him in less pain he is visibly less agitated. Mitt restraints and abd belt on at present, sitter at BS. Pt is a poor historian, family increasingly  concerned about rapid cognition decline in past several days. Most of collateral information provided by family. Agitation and worsening psychosis/cognition with Norco administered previously, so this PRN analgesic was dc'd.       Past Psychiatric History:   Past Psych medications: Zoloft, Namenda, Melatonin (OTC)  Currently in treatment with PCP/neurology (Dr. Beyer)  Education: post college graduate work or degree    Substance Abuse History:  Recreational drugs:  denies  Use of Alcohol: denied  Use of Caffeine: denies use  Use of OTC: n/a    Patient Active Problem List    Diagnosis Date Noted    Fall 02/10/2023     History reviewed. No pertinent past medical history.   History reviewed. " No pertinent surgical history.   Medications Prior to Admission   Medication Sig Dispense Refill Last Dose    ascorbic acid, vitamin C, (VITAMIN C) 500 MG tablet Take 500 mg by mouth once daily.   Past Week    cyclobenzaprine (FLEXERIL) 10 MG tablet Take 1 tablet (10 mg total) by mouth 3 (three) times daily as needed for Muscle spasms. 15 tablet 0 Past Week    finasteride (PROSCAR) 5 mg tablet Take 5 mg by mouth once daily.   Past Week    fluticasone propionate (FLONASE) 50 mcg/actuation nasal spray 1 spray by Each Nostril route daily as needed.   Past Week    HYDROcodone-acetaminophen (NORCO) 5-325 mg per tablet Take 1 tablet by mouth every 6 (six) hours as needed for Pain. 16 tablet 0 2/10/2023    LIDOcaine (LIDODERM) 5 % Place 1 patch onto the skin once daily. Remove & Discard patch within 12 hours or as directed by MD for 7 days 7 patch 0 Past Week    memantine (NAMENDA) 10 MG Tab Take 10 mg by mouth 2 (two) times daily.   Past Week    multivitamin with folic acid 400 mcg Tab Take 1 tablet by mouth once daily.   Past Week    sertraline (ZOLOFT) 50 MG tablet Take 50 mg by mouth every evening.   Past Week    tamsulosin (FLOMAX) 0.4 mg Cap Take 1 capsule by mouth 2 (two) times daily.   Past Week     Review of patient's allergies indicates:  No Known Allergies   Social History     Tobacco Use    Smoking status: Never    Smokeless tobacco: Never   Substance Use Topics    Alcohol use: Not on file     Psychiatric Review Of Systems:  sleep: yes  appetite changes: no  weight changes: no  energy/anergy: yes  interest/pleasure/anhedonia: no  somatic symptoms: no  libido: no  anxiety/panic: yes  guilty/hopeless: no  S.I.B.s/risky behavior: no  any drugs: no  alcohol: no       Objective:  Vital signs:  Temp:  [98.2 °F (36.8 °C)-99.2 °F (37.3 °C)] 98.4 °F (36.9 °C)  Pulse:  [62-69] 69  Resp:  [18-20] 18  SpO2:  [95 %-100 %] 96 %  BP: (127-144)/(63-70) 144/70        Mental Status Evaluation:  Appearance:  unremarkable, age  appropriate, lying in bed   Behavior:  friendly and cooperative, psychomotor agitation, restless and fidgety , eye contact minimal   Speech:  soft, hesitant, minimal, attempts to answer questions appropriately   Mood:  anxious   Affect:  inappropriate, mood-incongruent, anxious   Thought Process:  blocked, loose associations   Thought Content:  hallucinations: (visual: yes)   Sensorium:  person, place, believed it to be [birth year], 1916; Wednesday   Cognition:  memory > able to remember recent events- no, able to remember remote events- no   Insight:  limited   Judgment:  limited     Assessment/Plan:  Axis I: Dementia with behavioral disturbances/psychosis, r/o Medical Induced psychosis, h/o Anxiety NOS  Axis II: Deferred  Axis III: History reviewed. No pertinent past medical history.  Axis IV: occupational problems and problems with access to health care services  Axis V: 0 Inadequate information      Recommendations:   1.) move Seroquel to 8pm (recently increased), increase Melatonin 6mg po 8pm (monitor sleep) move zoloft to AM  2.) cont sitter at BS/1:1 for safety  3.) psych cont to follow

## 2023-02-14 NOTE — PT/OT/SLP PROGRESS
Physical Therapy Treatment    Patient Name:  John Collado   MRN:  [id number]    Recommendations:     Discharge Recommendations: nursing facility, basic  Discharge Equipment Recommendations: walker, rolling  Barriers to discharge: None    Assessment:     John Collado is a 82 y.o. male admitted with a medical diagnosis of Fall.  He presents with the following impairments/functional limitations: impaired cognition, gait instability.    Rehab Prognosis: Good; patient would benefit from acute skilled PT services to address these deficits and reach maximum level of function.    Recent Surgery: * No surgery found *      Plan:     During this hospitalization, patient to be seen 6 x/week to address the identified rehab impairments via gait training, therapeutic activities, therapeutic exercises and progress toward the following goals:    Plan of Care Expires:       Subjective     Chief Complaint: None  Patient/Family Comments/goals:   Pain/Comfort:         Objective:     Pt in bed upon entry with 1:1 in room.     General Precautions: Standard,    Orthopedic Precautions:    Braces: TLSO  Respiratory Status: Room air        Functional Mobility:  Bed Mobility:     Supine to Sit: minimum assistance  Transitional Sit-to-stand: Min A with RW  Gait: Min A with RW  Pt amb 120ft  X2 with step through gait pattern. Pt presents with steady jessica and no LOB.     Patient left up in chair with  family and 1:1 present..    GOALS:   Multidisciplinary Problems       Physical Therapy Goals          Problem: Physical Therapy    Goal Priority Disciplines Outcome Goal Variances Interventions   Physical Therapy Goal     PT, PT/OT Ongoing, Progressing     Description: Goals to be met by: 2023     Patient will increase functional independence with mobility by performin. Supine to sit with Modified [place]  2. Sit to supine with Modified [place]  3. Bed to chair transfer with Modified [place] using No Assistive  Device  4. Gait  x 400 feet with [place] using No Assistive Device.                          Time Tracking:     PT Received On: 02/14/23  PT Start Time: 1035     PT Stop Time: 1101  PT Total Time (min): 26 min     Billable Minutes: Gait Training 16 and Therapeutic Activity 10    Treatment Type: Treatment  PT/PTA: PT     PTA Visit Number: 2     02/14/2023

## 2023-02-14 NOTE — PROGRESS NOTES
[facility] MEDICINE  PROGRESS NOTE        CHIEF COMPLAINT   Hospital follow up    HOSPITAL COURSE   Mr Collado is a 82-year-old gentleman with Dementia and recently diagnosed L1 Compression Fracture on 02/08/22 presents to the ED with complaints of left hip pain after sustaining another ground level fall yesterday evening. Pt daughter at bedside, gives report. Pt lives with wife and is independent of functional ADLs. Pt wife was present during initial fall and there was no head trauma or LOC however details of fall yesterday are unclear. Pt with baseline bradycardia per daughter, stating that he runs 1 miles daily up until a few days ago.      Initial VS showed Temperature 97.3°, heart rate 64, respirations 18, blood pressure 149/77.  While in the ED patient with HR in the 50s.  X-ray of the hip was negative for acute fractures.  Due to his repeated falls he is being admitted to the hospitalist service for PT evaluation & Rehab placement. He also had an mild TEJAS. PT/OT consulted.  Patient noted to have 900 mL in bladder after he had urge to pee and could not void.  Placing Mcfadden and will consult Urology. Echocardiogram and Carotid U/S ordered and pending; will follow up on report.  BUN/creatinine noted to be 37.9/1.67, continued on IV 0.45% NS at 100 mL/hr.    Today  Seen examined this morning.  Much more calm and cooperative this morning able to answer simple questions and denied having any pain and nothing bothering him currently.  Spoke with daughter at the bedside as well who was concerned about pain and sleep which we will address for him.  Daughter stated that patient had been having decline for about 6 months now more so worsened in the last couple of weeks.        OBJECTIVE/PHYSICAL EXAM     VITAL SIGNS (MOST RECENT):  Temp: 97.7 °F (36.5 °C) (02/14/23 0700)  Pulse: (!) 54 (02/14/23 0700)  Resp: 18 (02/14/23 0700)  BP: (!) 158/74 (02/14/23 0700)  SpO2: 98 % (02/14/23  0700)   VITAL SIGNS (24 HOUR RANGE):  Temp:  [97 °F (36.1 °C)-99.2 °F (37.3 °C)] 97.7 °F (36.5 °C)  Pulse:  [47-69] 54  Resp:  [18-20] 18  SpO2:  [95 %-98 %] 98 %  BP: (138-164)/(56-74) 158/74   GENERAL: In no acute distress, afebrile  HEENT:  CHEST: Clear to auscultation bilaterally  HEART: S1, S2, no appreciable murmur  ABDOMEN: Soft, nontender, BS +  MSK: Warm, no lower extremity edema, no clubbing or cyanosis  NEUROLOGIC:  Alert alert and following simple commands, moving all extremities with good strength  INTEGUMENTARY:  PSYCHIATRY:        ASSESSMENT/PLAN   Recurrent falls  Bilateral hip pain/osteoarthritis   L1 compression fracture  Acute kidney injury on CKD   COVID-19 infection   Acute urinary retention  Acute encephalopathy/delirium on dementia      Urology signed off.  Continue Flomax and finasteride.  Indwelling Mcfadden catheter with voiding trials in about a week.  Voiding trial end of the week.  Renal functions continue to improve likely postobstructive TEJAS  Continue Seroquel at nighttime and we orientation techniques, melatonin at nighttime  Continue scheduled Tylenol and tramadol as needed  Supportive care for COVID infection.  Continue PT and OT    DVT prophylaxis:  Lovenox 40    Anticipated discharge and disposition:   __________________________________________________________________________    LABS/MICRO/MEDS/DIAGNOSTICS       LABS  Recent Labs     02/14/23  0407      K 4.1   CHLORIDE 112*   CO2 21*   BUN 23.0   CREATININE 1.29*   GLUCOSE 80*   CALCIUM 8.5*       Recent Labs     02/12/23  0211   WBC 5.9   RBC 3.14*   HCT 29.7*   MCV 94.6*            MICROBIOLOGY  Microbiology Results (last 7 days)       ** No results found for the last 168 hours. **               MEDICATIONS   acetaminophen  1,000 mg Oral TID    enoxaparin  30 mg Subcutaneous Daily    finasteride  5 mg Oral Daily    melatonin  6 mg Oral Nightly    memantine  10 mg Oral BID    multivitamin  1 tablet Oral Daily     QUEtiapine  50 mg Oral Q24H    senna-docusate 8.6-50 mg  2 tablet Oral QHS    sertraline  50 mg Oral Daily    tamsulosin  1 capsule Oral BID         INFUSIONS           DIAGNOSTIC TESTS  X-Ray Chest 1 View   Final Result      No acute cardiopulmonary process.         Electronically signed by: Roque Ma   Date:    02/13/2023   Time:    07:14      X-Ray Chest 1 View   Final Result      No acute cardiopulmonary abnormality.         Electronically signed by: Latasha Barba   Date:    02/12/2023   Time:    10:29      US Retroperitoneal Complete   Final Result      No hydronephrosis      Nonobstructing left nephrolithiasis         Electronically signed by: Latasha Barba   Date:    02/11/2023   Time:    13:16      CT Head Without Contrast   Final Result      Chronic age-related changes.  No acute process         Electronically signed by: Lavinia Jason   Date:    02/10/2023   Time:    17:25      CT Cervical Spine Without Contrast   Final Result      Degenerative changes in the cervical spine      No evidence of acute trauma         Electronically signed by: Lavinia Jason   Date:    02/10/2023   Time:    17:28      X-Ray Hip 2 or 3 views Left (with Pelvis when performed)   Final Result      No acute osseous abnormality.         Electronically signed by: Latasha Barba   Date:    02/10/2023   Time:    12:28           EF   Date Value Ref Range Status   02/11/2023 60 % Final              Case related differential diagnoses have been reviewed; assessment and plan has been documented. I have personally reviewed the labs and test results that are currently available; I have reviewed the patients medication list. I have reviewed the consulting providers recommendations. I have reviewed or attempted to review medical records based upon their availability.  All of the patient's and/or family's questions have been addressed and answered to the best of my ability.  I will continue to monitor closely and make  adjustments to medical management as needed.  This document was created using M*Modal Fluency Direct.  Transcription errors may have been made.  Please contact me if any questions may rise regarding documentation to clarify transcription.        Alexis Foy MD   02/14/2023   Internal Medicine

## 2023-02-14 NOTE — PHYSICIAN QUERY
PT Name: John Collado  MR #: [id number]  DOCUMENTATION CLARIFICATION   Mei Bravo RN, CCDS    [email or web address]    Documentation Excellence  This form is a permanent document in the medical record.     Query Date: February 14, 2023    By submitting this query, we are merely seeking further clarification of documentation.  Please utilize your independent clinical judgment when addressing the question(s) below.    The Medical Record contains the following:   Indicators Supporting Clinical Findings Location in Medical Record   x CKD or Chronic Kidney (Renal) Failure/Disease TEJAS on ? CKD    CKD   H&P    2/13 Hosp PN   x BUN/  Creatinine                          GFR  02/10/23 12:35 02/11/23 05:12 02/12/23 02:11 02/14/23 04:07   BUN 45.0 (H) 37.8 (H) 36.3 (H) 23.0   Cr  1.84 (H) 1.67 (H) 1.69 (H) 1.29 (H)   BUN/CR     RATIO  23 21 18   eGFR 36 41 40 56    lab   x Dehydration  Intravascular Depletion Hosp PN 2/11     Nausea / Vomiting      Dialysis / CRRT      Medication     x Treatment PLAN:   - Telemetry. EKG pending. Echocardiogram and Carotid US  - PRN analgesics. IVFs, avoid nephrotoxic agents  - PT consult. Fall precautions.  - CBC, CMP   H&P   x Other Chronic Conditions Past Medical History  Dementia   L1 compression fracture diagnosed on 02/08/23   H&P   x Other Urine Retention with TEJAS        NAGMA     Normocytic anemia   AMS 2/2 delirium +/- dementia    COVID-19 infection   Acute urinary retention                   Acute encephalopathy/delirium on dementia Hosp PN 2/11 2/12 Hosp PN      2/13 Hosp PN     Provider, please further specify the stage of Chronic Kidney Disease (CKD):    [   ] Chronic Kidney Disease (CKD) stage 3a - Mildly to moderately decreased eGFR 45-59   [   ] Chronic Kidney Disease (CKD) stage 3b - Moderately to severely decreased eGFR 30-44   [   ] Other ckd stage - specify : _________________   [ x  ] Clinically Undetermined     Please document in your progress notes daily for the  duration of treatment until resolved and include in your discharge summary.    Reference:     ANNELISE Perez MD, & ELLIOT Loco MD, MS. (2020, June 18). Definition and staging of chronic kidney disease in adults ([id number] PAOLA Piña MD, ScD & [id number] PADMINI Perez MD, MSc, Eds.). Retrieved October 21, 2020, from https://www.[employer]/contents/definition-and-staging-of-chronic-kidney-disease-in-adults?search=ckd%20staging&source=search_result&selectedTitle=1~150&usage_type=default&display_rank=1    Form No. 35341

## 2023-02-14 NOTE — PLAN OF CARE
Problem: Occupational Therapy  Goal: Occupational Therapy Goal  Description: Goals to be met by: 2/28/2023     Patient will increase functional independence with ADLs by performing:    LE Dressing with Supervision.  Grooming while standing at sink with Supervision.  Toileting from toilet with Supervision for hygiene and clothing management.   Bathing from  shower chair/bench with Supervision.  Toilet transfer to toilet with Supervision.    Outcome: Ongoing, Progressing

## 2023-02-14 NOTE — PT/OT/SLP EVAL
Occupational Therapy   Evaluation    Name: John Collado  MRN: [id number]  Admitting Diagnosis: Fall  Recent Surgery: * No surgery found *      Recommendations:     Discharge Recommendations: nursing facility, skilled  Discharge Equipment Recommendations:  walker, rolling  Barriers to discharge:  None    Assessment:     John Collado is a 82 y.o. male with a medical diagnosis of Fall, L1 compression fracture, B hip osteoarthritis, COVID 19, delirium on dementia. He presents with one significant loss of balance during session with min A to correct. Patient required min A for toilet transfer and CGA to brush teeth standing at sink. Patient is at increased risk of falls due to impulsivity and cognitive deficits. OT is recommending SNF placement at d/c due to increased fall risk. Performance deficits affecting function: weakness, gait instability, impaired balance, impaired endurance, impaired self care skills, impaired cognition, impaired functional mobility, decreased safety awareness.      Rehab Prognosis: Fair; patient would benefit from acute skilled OT services to address these deficits and reach maximum level of function.       Plan:     Patient to be seen 5 x/week to address the above listed problems via self-care/home management, therapeutic activities, therapeutic exercises  Plan of Care Expires: 02/28/23  Plan of Care Reviewed with: patient    Subjective     Chief Complaint: none stated  Patient/Family Comments/goals: none stated    Occupational Profile:  Living Environment: Patient lived with wife in a [facility] with 4 steps to enter.   Previous level of function: independent with self-care tasks   Roles and Routines: former    Equipment Used at Home: none  Assistance upon Discharge: unknown; wife was supervising patient previously per chart review, wife not in room at time of eval     Pain/Comfort:  Pain Rating 1: 0/10    Patients cultural, spiritual, Temple conflicts given the current  situation:      Objective:     Communicated with: [facility] prior to session.  Patient found HOB elevated with mead catheter, telemetry, 1:1 sitter upon OT entry to room.    General Precautions: Standard, fall, Contact, droplet, airborne  Orthopedic Precautions: N/A  Braces:    Respiratory Status: Room air    Occupational Performance:    Bed Mobility:    Patient completed Supine to Sit with supervision  Patient completed Sit to Supine with supervision    Functional Mobility/Transfers:  Patient completed Sit <> Stand Transfer with contact guard assistance  with  rolling walker   Patient completed Toilet Transfer Step Transfer technique with minimum assistance with  rolling walker  Functional Mobility: Pt required min A for mobility to/from bathroom with RW. 1 LOB, min A to correct.     Activities of Daily Living:  Grooming: contact guard assistance to brush teeth.   Toileting: contact guard assistance for clothing management.     Cognitive/Visual Perceptual:  Cognitive/Psychosocial Skills:     -       Oriented to: Person, Place, Time, and Situation   -       Follows Commands/attention:Easily distracted and Follows one-step commands      Physical Exam:  Balance:    -       CGA dynamic standing balance  Upper Extremity Strength:    -       Right Upper Extremity: WFL  -       Left Upper Extremity: WFL  Fine Motor Coordination:    -       Impaired B finger to nose opposition; potential due to impaired depth perception     Treatment & Education:  Patient educated on OT POC. verbalized understanding, but needs reinforcement.     Patient left HOB elevated with all lines intact, call button in reach, and 1:1 present    GOALS:   Multidisciplinary Problems       Occupational Therapy Goals          Problem: Occupational Therapy    Goal Priority Disciplines Outcome Interventions   Occupational Therapy Goal     OT, PT/OT Ongoing, Progressing    Description: Goals to be met by: 2/28/2023     Patient will increase functional  independence with ADLs by performing:    LE Dressing with Supervision.  Grooming while standing at sink with Supervision.  Toileting from toilet with Supervision for hygiene and clothing management.   Bathing from  shower chair/bench with Supervision.  Toilet transfer to toilet with Supervision.                         History:     History reviewed. No pertinent past medical history.    History reviewed. No pertinent surgical history.    Time Tracking:     OT Date of Treatment: 02/14/23  OT Start Time: 1408  OT Stop Time: 1431  OT Total Time (min): 23 min    Billable Minutes:Evaluation 23 min MOD    2/14/2023

## 2023-02-15 PROCEDURE — 97116 GAIT TRAINING THERAPY: CPT | Mod: CQ

## 2023-02-15 PROCEDURE — 27000207 HC ISOLATION

## 2023-02-15 PROCEDURE — 21400001 HC TELEMETRY ROOM

## 2023-02-15 PROCEDURE — 25000003 PHARM REV CODE 250: Performed by: INTERNAL MEDICINE

## 2023-02-15 PROCEDURE — 97535 SELF CARE MNGMENT TRAINING: CPT | Mod: CO

## 2023-02-15 PROCEDURE — 63600175 PHARM REV CODE 636 W HCPCS: Performed by: INTERNAL MEDICINE

## 2023-02-15 PROCEDURE — 25000003 PHARM REV CODE 250: Performed by: NURSE PRACTITIONER

## 2023-02-15 PROCEDURE — 97530 THERAPEUTIC ACTIVITIES: CPT | Mod: CQ

## 2023-02-15 PROCEDURE — 97530 THERAPEUTIC ACTIVITIES: CPT | Mod: CO

## 2023-02-15 PROCEDURE — 63600175 PHARM REV CODE 636 W HCPCS: Performed by: NURSE PRACTITIONER

## 2023-02-15 RX ADMIN — TAMSULOSIN HYDROCHLORIDE 0.4 MG: 0.4 CAPSULE ORAL at 10:02

## 2023-02-15 RX ADMIN — THERA TABS 1 TABLET: TAB at 10:02

## 2023-02-15 RX ADMIN — ACETAMINOPHEN 1000 MG: 500 TABLET, FILM COATED ORAL at 08:02

## 2023-02-15 RX ADMIN — SENNOSIDES AND DOCUSATE SODIUM 2 TABLET: 50; 8.6 TABLET ORAL at 08:02

## 2023-02-15 RX ADMIN — ACETAMINOPHEN 1000 MG: 500 TABLET, FILM COATED ORAL at 05:02

## 2023-02-15 RX ADMIN — HALOPERIDOL LACTATE 2 MG: 5 INJECTION, SOLUTION INTRAMUSCULAR at 09:02

## 2023-02-15 RX ADMIN — ACETAMINOPHEN 1000 MG: 500 TABLET, FILM COATED ORAL at 10:02

## 2023-02-15 RX ADMIN — TRAMADOL HYDROCHLORIDE 50 MG: 50 TABLET, COATED ORAL at 10:02

## 2023-02-15 RX ADMIN — TAMSULOSIN HYDROCHLORIDE 0.4 MG: 0.4 CAPSULE ORAL at 08:02

## 2023-02-15 RX ADMIN — QUETIAPINE FUMARATE 50 MG: 25 TABLET ORAL at 08:02

## 2023-02-15 RX ADMIN — MEMANTINE 10 MG: 5 TABLET ORAL at 08:02

## 2023-02-15 RX ADMIN — FINASTERIDE 5 MG: 5 TABLET, FILM COATED ORAL at 10:02

## 2023-02-15 RX ADMIN — MEMANTINE 10 MG: 5 TABLET ORAL at 10:02

## 2023-02-15 RX ADMIN — ENOXAPARIN SODIUM 40 MG: 40 INJECTION SUBCUTANEOUS at 05:02

## 2023-02-15 RX ADMIN — Medication 6 MG: at 08:02

## 2023-02-15 NOTE — PROGRESS NOTES
[facility]  HOSPITAL MEDICINE  PROGRESS NOTE        CHIEF COMPLAINT   Hospital follow up    HOSPITAL COURSE   Mr Collado is a 82-year-old gentleman with Dementia and recently diagnosed L1 Compression Fracture on 02/08/22 presents to the ED with complaints of left hip pain after sustaining another ground level fall yesterday evening. Pt daughter at bedside, gives report. Pt lives with wife and is independent of functional ADLs. Pt wife was present during initial fall and there was no head trauma or LOC however details of fall yesterday are unclear. Pt with baseline bradycardia per daughter, stating that he runs 1 miles daily up until a few days ago.      Initial VS showed Temperature 97.3°, heart rate 64, respirations 18, blood pressure 149/77.  While in the ED patient with HR in the 50s.  X-ray of the hip was negative for acute fractures.  Due to his repeated falls he is being admitted to the hospitalist service for PT evaluation & Rehab placement. He also had an mild TEJAS. PT/OT consulted.  Patient noted to have 900 mL in bladder after he had urge to pee and could not void.  Placing Mcfadden and will consult Urology. Echocardiogram and Carotid U/S ordered and pending; will follow up on report.  BUN/creatinine noted to be 37.9/1.67, continued on IV 0.45% NS at 100 mL/hr.    Today  Seen examined this morning.  Seen examined this morning.  Slept well per the CNA at the bedside all night.  Seems to be doing well this morning and no complaints.  No pain.        OBJECTIVE/PHYSICAL EXAM     VITAL SIGNS (MOST RECENT):  Temp: 97.8 °F (36.6 °C) (02/15/23 0721)  Pulse: (!) 55 (02/15/23 0721)  Resp: 20 (02/14/23 2231)  BP: (!) 133/52 (02/15/23 0721)  SpO2: 96 % (02/15/23 0721)   VITAL SIGNS (24 HOUR RANGE):  Temp:  [97.7 °F (36.5 °C)-97.9 °F (36.6 °C)] 97.8 °F (36.6 °C)  Pulse:  [49-62] 55  Resp:  [18-20] 20  SpO2:  [94 %-100 %] 96 %  BP: (115-133)/(52-74) 133/52   GENERAL: In no acute distress,  afebrile  HEENT:  CHEST: Clear to auscultation bilaterally  HEART: S1, S2, no appreciable murmur  ABDOMEN: Soft, nontender, BS +  MSK: Warm, no lower extremity edema, no clubbing or cyanosis  NEUROLOGIC:  Alert alert and following simple commands, moving all extremities with good strength  INTEGUMENTARY:  PSYCHIATRY:        ASSESSMENT/PLAN   Recurrent falls  Bilateral hip pain/osteoarthritis   L1 compression fracture  Acute kidney injury on CKD   COVID-19 infection   Acute urinary retention  Acute encephalopathy/delirium on dementia      Urology signed off.  Continue Flomax and finasteride.  Indwelling Mcfadden catheter with voiding trials in about a week.  Voiding trial end of the week.  Renal functions continue to improve likely postobstructive TEJAS  Continue Seroquel at nighttime and we orientation techniques, melatonin at nighttime  Continue scheduled Tylenol and tramadol as needed  Supportive care for COVID infection.  Continue PT and OT  Case management following, pending placement.    DVT prophylaxis:  Lovenox 40    Anticipated discharge and disposition:   __________________________________________________________________________    LABS/MICRO/MEDS/DIAGNOSTICS       LABS  Recent Labs     02/14/23  0407      K 4.1   CHLORIDE 112*   CO2 21*   BUN 23.0   CREATININE 1.29*   GLUCOSE 80*   CALCIUM 8.5*       No results for input(s): WBC, RBC, HCT, MCV, PLT in the last 72 hours.    Invalid input(s):       MICROBIOLOGY  Microbiology Results (last 7 days)       ** No results found for the last 168 hours. **               MEDICATIONS   acetaminophen  1,000 mg Oral TID    enoxaparin  40 mg Subcutaneous Daily    finasteride  5 mg Oral Daily    melatonin  6 mg Oral Nightly    memantine  10 mg Oral BID    multivitamin  1 tablet Oral Daily    QUEtiapine  50 mg Oral Q24H    senna-docusate 8.6-50 mg  2 tablet Oral QHS    sertraline  50 mg Oral Daily    tamsulosin  1 capsule Oral BID         INFUSIONS           DIAGNOSTIC  TESTS  X-Ray Chest 1 View   Final Result      No acute cardiopulmonary process.         Electronically signed by: Roque Ma   Date:    02/13/2023   Time:    07:14      X-Ray Chest 1 View   Final Result      No acute cardiopulmonary abnormality.         Electronically signed by: Latasha Barba   Date:    02/12/2023   Time:    10:29      US Retroperitoneal Complete   Final Result      No hydronephrosis      Nonobstructing left nephrolithiasis         Electronically signed by: Latasha Barba   Date:    02/11/2023   Time:    13:16      CT Head Without Contrast   Final Result      Chronic age-related changes.  No acute process         Electronically signed by: Lavinia Jason   Date:    02/10/2023   Time:    17:25      CT Cervical Spine Without Contrast   Final Result      Degenerative changes in the cervical spine      No evidence of acute trauma         Electronically signed by: Lavinia Jason   Date:    02/10/2023   Time:    17:28      X-Ray Hip 2 or 3 views Left (with Pelvis when performed)   Final Result      No acute osseous abnormality.         Electronically signed by: Latasha Barba   Date:    02/10/2023   Time:    12:28           EF   Date Value Ref Range Status   02/11/2023 60 % Final              Case related differential diagnoses have been reviewed; assessment and plan has been documented. I have personally reviewed the labs and test results that are currently available; I have reviewed the patients medication list. I have reviewed the consulting providers recommendations. I have reviewed or attempted to review medical records based upon their availability.  All of the patient's and/or family's questions have been addressed and answered to the best of my ability.  I will continue to monitor closely and make adjustments to medical management as needed.  This document was created using M*Modal Fluency Direct.  Transcription errors may have been made.  Please contact me if any questions may  rise regarding documentation to clarify transcription.        Alexis Foy MD   02/15/2023   Internal Medicine

## 2023-02-15 NOTE — PLAN OF CARE
02/15/23 1049   Discharge Reassessment   Assessment Type Discharge Planning Reassessment   Post-Acute Status   Post-Acute Authorization Placement   Post-Acute Placement Status Referrals Sent  ([facility])

## 2023-02-15 NOTE — PT/OT/SLP PROGRESS
Occupational Therapy  Treatment    John Collado   MRN: [id number]   Admitting Diagnosis: Fall    OT Date of Treatment: 02/15/23   OT Start Time: 1350  OT Stop Time: 1419  OT Total Time (min): 29 min     Billable Minutes:  Self Care/Home Management 14 and Therapeutic Activity 15  Total Minutes: 29     OT/MAURO: MAURO     MAURO Visit Number: 1    General Precautions: Standard, fall  Orthopedic Precautions: N/A  Braces:      Spiritual, Cultural Beliefs, Sabianist Practices, Values that Affect Care: no    Subjective:  Communicated with nurse prior to session.    Objective:  Patient found with: mead catheter, telemetry    Functional Mobility:  Bed Mobility:   Supine to sit: Contact Guard Assistance   Sit to supine: Contact Guard Assistance   Rolling: Contact Guard Assistance   Scooting: Contact Guard Assistance    Transfer Training:   Functional mobility in room with CGA, toilet transfer with CGA.    Grooming:  Standing at sink to wash hands with CGA    Toilet Training:  Toileting with SBA seated on toilet    Additional Treatment:  Patient tolerated Tx well, very pleasant    Patient left HOB elevated with all lines intact and call button in reach    ASSESSMENT:  John Collado is a 82 y.o. male with a medical diagnosis of Fall and presents with decreased AX tolerance, decreased strength,  safety awareness and decreased ADL skills..    Rehab potential is good    Activity tolerance: Good    Discharge recommendations: nursing facility, basic     Equipment recommendations: walker, rolling     GOALS:   Multidisciplinary Problems       Occupational Therapy Goals          Problem: Occupational Therapy    Goal Priority Disciplines Outcome Interventions   Occupational Therapy Goal     OT, PT/OT Ongoing, Progressing    Description: Goals to be met by: 2023     Patient will increase functional independence with ADLs by performing:    LE Dressing with Supervision.  Grooming while standing at sink with  Supervision.  Toileting from toilet with Supervision for hygiene and clothing management.   Bathing from  shower chair/bench with Supervision.  Toilet transfer to toilet with Supervision.                         Plan:  Patient to be seen 5 x/week to address the above listed problems via self-care/home management, therapeutic activities, therapeutic exercises  Plan of Care expires: 02/28/23  Plan of Care reviewed with: patient         02/15/2023

## 2023-02-15 NOTE — PT/OT/SLP PROGRESS
Physical Therapy Treatment    Patient Name:  Jonh Collado   MRN:  [id number]    Recommendations:     Discharge Recommendations: nursing facility, basic  Discharge Equipment Recommendations: walker, rolling  Barriers to discharge: None    Assessment:     John Collado is a 82 y.o. male admitted with a medical diagnosis of Fall.  He presents with the following impairments/functional limitations: gait instability, impaired balance .    Rehab Prognosis: Good; patient would benefit from acute skilled PT services to address these deficits and reach maximum level of function.    Recent Surgery: * No surgery found *      Plan:     During this hospitalization, patient to be seen 6 x/week to address the identified rehab impairments via gait training, therapeutic activities, therapeutic exercises and progress toward the following goals:    Plan of Care Expires:       Subjective     Chief Complaint: None  Patient/Family Comments/goals:   Pain/Comfort:         Objective:     Pt in bed upon entry with 1:1 present    General Precautions: Standard,    Orthopedic Precautions:    Braces: TLSO  Respiratory Status: Room air  Blood Pressure:      Functional Mobility:  Bed Mobility:     Supine to Sit: contact guard assistance  Gait: SBA with RW  Pt amb 200ft with step through gait pattern. Pt presents with fast jessica and no LOB.   Transfer training: SBA with RW  Pt amb to bathroom commode for - BM.       Patient left up in chair with  1:1 present..    GOALS:   Multidisciplinary Problems       Physical Therapy Goals          Problem: Physical Therapy    Goal Priority Disciplines Outcome Goal Variances Interventions   Physical Therapy Goal     PT, PT/OT Ongoing, Progressing     Description: Goals to be met by: 2023     Patient will increase functional independence with mobility by performin. Supine to sit with Modified [place]  2. Sit to supine with Modified [place]  3. Bed to chair transfer with Modified  [place] using No Assistive Device  4. Gait  x 400 feet with [place] using No Assistive Device.                          Time Tracking:     PT Received On: 02/15/23  PT Start Time: 0916     PT Stop Time: 0940  PT Total Time (min): 24 min     Billable Minutes: Gait Training 10 and Therapeutic Activity 14    Treatment Type: Treatment  PT/PTA: PTA     PTA Visit Number: 3     02/15/2023

## 2023-02-15 NOTE — PSYCH
[facility] - 9th Floor Med Surg  Consult Note  Psychiatry        Referring Physician: Carrol James MD    Examiner: Ana Cristina Nunez NP  Date: 2/14/2023  Patient Status: Inpt  patient seen individually, chart reviewed, and case discussed with staff      Slept better last night per family at BS. Mitt restraints removed. Some headaches, wants to get up and walk. Sitting in chair, PT consulted. Grand-DIL at BS. APAP routine to help with pain. Anxious and restless at present, but otherwise appears pleasantly confused for the most part. Is unable to recognize this provider that saw him yesterday. Questionable ability to make needs known. Family reports encouraging pt's pain management more often, he appears less restless and agitated after taking analgesics but he usually denies pain when prompted. Compliant with meds PO at present. Appetite fair with encouragement from family to eat. Per nurse less flailing arms and RTIS, appears to be experiencing less hallucinations and disorganized thought content. Able to be redirected an reoriented briefly.     Mental Status Exam:     Eye Contact: fair    Attitude toward examiner: cooperative    Motor Activity: restless    Speech: pressured and when prompted, responses often inappropriate    Cognitions: oriented x 1 and Monday, 2023, December    PERCEPTIONS: responding to internal stimuli, hallucinations:  visual, and decreased    THOUGHT PROCESSES: distractibility, disorganized, and loose associations    THOUGHT CONTENT: DENIES suicidal ideation and DENIES homicidal ideation    AFFECT/MOOD: anxious    INSIGHT:  questionable    JUDGEMENT:  questionable    Recommendations:   1.) cont current psych med management, monitor sleep/RTIS eduard. Encourage family at BS for re-orientation and redirection as appropriate (requiring frequently at present)  2.) psych cont to follow

## 2023-02-16 PROCEDURE — 63600175 PHARM REV CODE 636 W HCPCS: Performed by: NURSE PRACTITIONER

## 2023-02-16 PROCEDURE — 97530 THERAPEUTIC ACTIVITIES: CPT | Mod: CQ

## 2023-02-16 PROCEDURE — 97116 GAIT TRAINING THERAPY: CPT | Mod: CQ

## 2023-02-16 PROCEDURE — 27000207 HC ISOLATION

## 2023-02-16 PROCEDURE — 63600175 PHARM REV CODE 636 W HCPCS: Performed by: INTERNAL MEDICINE

## 2023-02-16 PROCEDURE — 21400001 HC TELEMETRY ROOM

## 2023-02-16 PROCEDURE — 25000003 PHARM REV CODE 250: Performed by: INTERNAL MEDICINE

## 2023-02-16 PROCEDURE — 25000003 PHARM REV CODE 250: Performed by: NURSE PRACTITIONER

## 2023-02-16 PROCEDURE — 97535 SELF CARE MNGMENT TRAINING: CPT

## 2023-02-16 RX ADMIN — ACETAMINOPHEN 1000 MG: 500 TABLET, FILM COATED ORAL at 05:02

## 2023-02-16 RX ADMIN — MEMANTINE 10 MG: 5 TABLET ORAL at 09:02

## 2023-02-16 RX ADMIN — TAMSULOSIN HYDROCHLORIDE 0.4 MG: 0.4 CAPSULE ORAL at 09:02

## 2023-02-16 RX ADMIN — QUETIAPINE FUMARATE 50 MG: 25 TABLET ORAL at 04:02

## 2023-02-16 RX ADMIN — FINASTERIDE 5 MG: 5 TABLET, FILM COATED ORAL at 09:02

## 2023-02-16 RX ADMIN — HALOPERIDOL LACTATE 2 MG: 5 INJECTION, SOLUTION INTRAMUSCULAR at 06:02

## 2023-02-16 RX ADMIN — Medication 6 MG: at 09:02

## 2023-02-16 RX ADMIN — SENNOSIDES AND DOCUSATE SODIUM 2 TABLET: 50; 8.6 TABLET ORAL at 09:02

## 2023-02-16 RX ADMIN — ACETAMINOPHEN 1000 MG: 500 TABLET, FILM COATED ORAL at 09:02

## 2023-02-16 RX ADMIN — THERA TABS 1 TABLET: TAB at 11:02

## 2023-02-16 RX ADMIN — ACETAMINOPHEN 650 MG: 500 TABLET, FILM COATED ORAL at 11:02

## 2023-02-16 RX ADMIN — ENOXAPARIN SODIUM 40 MG: 40 INJECTION SUBCUTANEOUS at 04:02

## 2023-02-16 RX ADMIN — SERTRALINE HYDROCHLORIDE 50 MG: 50 TABLET ORAL at 09:02

## 2023-02-16 NOTE — PROGRESS NOTES
[facility] Hospital Medicine Progress Note        Chief Complaint: Inpatient Follow-up for     HPI:     Hospital course reviewed    Interval Hx:    Was afebrile overnight.  CPU monitor was continued.  When seen at bedside he was alert, disoriented to location.  Answers other questions appropriately.  No family was at bedside.  Doing well on room air.  He has no new complaints or concerns.  Tolerating diet, moving bowels.  Objective/physical exam:  Vitals:    02/15/23 1545 02/15/23 2000 02/15/23 2300 02/16/23 0700   BP: 110/65 108/67 (!) 141/70 137/72   BP Location:       Patient Position:       Pulse: 61 70 (!) 55  Comment: 55 63   Resp:  17  16   Temp: 98.4 °F (36.9 °C) 98 °F (36.7 °C) 97.5 °F (36.4 °C) 97.8 °F (36.6 °C)   TempSrc: Oral Oral Oral Oral   SpO2: (!) 94% 97% 95% 95%   Weight:       Height:         General: In no acute distress, afebrile  Respiratory: Clear to auscultation bilaterally  Cardiovascular: S1, S2, no appreciable murmur  Abdomen: Soft, nontender, BS +  MSK: Warm, no lower extremity edema, no clubbing or cyanosis  Neurologic: Alert and oriented x2, moving all extremities with good strength     Lab Results   Component Value Date     02/14/2023    K 4.1 02/14/2023    CO2 21 (L) 02/14/2023    BUN 23.0 02/14/2023    CREATININE 1.29 (H) 02/14/2023    CALCIUM 8.5 (L) 02/14/2023    EGFRNONAA 46 07/31/2021      Lab Results   Component Value Date    ALT 19 02/10/2023    AST 28 02/10/2023    ALKPHOS 55 02/10/2023    BILITOT 0.7 02/10/2023      Lab Results   Component Value Date    WBC 5.9 02/12/2023    HGB 10.2 (L) 02/12/2023    HCT 29.7 (L) 02/12/2023    MCV 94.6 (H) 02/12/2023     02/12/2023           Medications:   acetaminophen  1,000 mg Oral TID    enoxaparin  40 mg Subcutaneous Daily    finasteride  5 mg Oral Daily    melatonin  6 mg Oral Nightly    memantine  10 mg Oral BID    multivitamin  1 tablet Oral Daily    QUEtiapine  50 mg Oral Q24H     senna-docusate 8.6-50 mg  2 tablet Oral QHS    sertraline  50 mg Oral Daily    tamsulosin  1 capsule Oral BID      acetaminophen, albuterol, aluminum-magnesium hydroxide-simethicone, benzonatate, haloperidol lactate, hydrALAZINE, melatonin, morphine, ondansetron, polyethylene glycol, prochlorperazine, senna-docusate 8.6-50 mg, simethicone, sodium chloride 0.9%, traMADoL     Assessment/Plan:    Recurrent falls  Bilateral hip pain/osteoarthritis   L1 compression fracture  Acute urinary retention needing mead  Post renal Acute kidney injury on CKD   COVID-19 infection  ( vaccinated)  Acute encephalopathy/delirium on dementia - improving      Plan:   - Continue Mead catheter.  Repeat labs for tomorrow.  Continue Flomax and finasteride.  Urology signed off.  Needs outpatient   Urology follow up.  Voiding trial and discontinue Mead when appropriate.  -  Psychiatry recommendations noted.mental status stabilizing.   monitor.  Continue Seroquel at bedtime , sertraline.   - stable from COVID-19 standpoint.  Doing well on room air.  Monitor for now.    -Therapy .  Encourage p.o. intake.needs placement  -Other home medications were reviewed.  Continue memantine    Lovenox             Gadiel Rome MD

## 2023-02-16 NOTE — PT/OT/SLP PROGRESS
Physical Therapy Treatment- Stroke    Patient Name:  John Collado   MRN:  [id number]    Recommendations:     Discharge Recommendations: home with home health, other (see comments) (Vs [facility] care depending on family's wishes)  Discharge Equipment Recommendations: walker, rolling  Barriers to discharge: None    Assessment:     John Collado is a 82 y.o. male admitted with a medical diagnosis of Fall.  He presents with the following impairments/functional limitations: impaired cognition. Pt has progressed to PLOF physically but cont to have safety awareness deficits due to dementia. Long discussion had with wife, daughter, OT, CM and PTA about pt's progress and currently being too high functioning for SNF placement. CM working on providing pt's family with information to allow for safe DC home. Pt will need 24hr supervision upon DC home.     Rehab Prognosis: Good; patient would benefit from acute skilled PT services to address these deficits and reach maximum level of function.    Recent Surgery: * No surgery found *      Plan:     During this hospitalization, patient to be seen 6 x/week to address the identified rehab impairments via gait training, therapeutic activities, therapeutic exercises and progress toward the following goals:    Plan of Care Expires:       Subjective     Chief Complaint: None  Patient/Family Comments/goals: Family requesting information that will help with determining either home with family support or [facility] care.   Pain/Comfort:  0/10      Objective:     Pt in bed upon entry with roll belt donned and wife and daughter in room.      General Precautions: Standard,    Orthopedic Precautions:    Braces: TLSO  Respiratory Status: Room air  Blood Pressure:      Functional Mobility:  Bed Mobility:     Supine to Sit: independent  Gait: SBA to Independent  Pt amb 200ft with RW and 500ft with no AD. Pt was able to turn and changed directions with no LOB.   Stair Training: SBA  Pt ascended  and descended 9 stairs with step through gait pattern.     Education:  spouse and daughter/s provided with verbal education regarding pt's current level of function.  Understanding was verbalized.     Patient left up in chair with call button in reach and family present..    GOALS:   Multidisciplinary Problems       Physical Therapy Goals          Problem: Physical Therapy    Goal Priority Disciplines Outcome Goal Variances Interventions   Physical Therapy Goal     PT, PT/OT Ongoing, Progressing     Description: Goals to be met by: 2023     Patient will increase functional independence with mobility by performin. Supine to sit with Modified [place]  2. Sit to supine with Modified [place]  3. Bed to chair transfer with Modified [place] using No Assistive Device  4. Gait  x 400 feet with [place] using No Assistive Device.                          Time Tracking:     PT Received On: 23  PT Start Time: 0956     PT Stop Time: 1039  PT Total Time (min): 43 min     Billable Minutes: Gait Training 20 and Therapeutic Activity 23    Treatment Type: Treatment  PT/PTA: PTA     PTA Visit Number: 4     2023

## 2023-02-16 NOTE — PROGRESS NOTES
Inpatient Nutrition Assessment    Admit Date: 2/10/2023   Total duration of encounter: 6 days     Nutrition Recommendation/Prescription     Continue regular diet as tolerated.    Add vanilla boost Plus TID. 360 kcals and 14 g protein per serving.     Consider appetite stimulant if no improvement in po intake soon.     Communication of Recommendations: reviewed with patient/caregiver and reviewed with nurse    Nutrition Assessment     Malnutrition Assessment/Nutrition-Focused Physical Exam      Degree of Malnutrition: does not meet criteria  Energy Intake: </= 50% of estimated energy requirement for >/= 5 days  Interpretation of Weight Loss: does not meet criteria  Body Fat:does not meet criteria  Area of Body Fat Loss: does not meet criteria  Muscle Mass Loss: does not meet criteria  Area of Muscle Mass Loss: does not meet criteria  Fluid Accumulation: does not meet criteria  Edema: does not meet criteria   Reduced  Strength: unable to obtain  A minimum of two characteristics is recommended for diagnosis of either severe or non-severe malnutrition.    Chart Review    Reason Seen: length of stay    Malnutrition Screening Tool Results   Have you recently lost weight without trying?: No  Have you been eating poorly because of a decreased appetite?: No   MST Score: 0     Diagnosis:  Recurrent falls  Bilateral hip pain/osteoarthritis   L1 compression fracture  Acute urinary retention needing mead  Post renal Acute kidney injury on CKD   COVID-19 infection  ( vaccinated)  Acute encephalopathy/delirium on dementia - improving    Relevant Medical History: dementia    Nutrition-Related Medications: MVI, Senna-Docusate  Calorie Containing IV Medications: no significant kcals from medications at this time    Nutrition-Related Labs:  2/14: Cl 112, Cr 1.29, Glu 80, Ca 8.5     Diet/PN Order: Diet Adult Regular  Oral Supplement Order: none  Tube Feeding Order: none  Appetite/Oral Intake: poor/25-50% of meals  Factors  "Affecting Nutritional Intake: impaired cognitive status/motor control and decreased appetite  Food/Mormonism/Cultural Preferences: none reported  Food Allergies: none reported    Skin Integrity: bruised (ecchymotic)  Wound(s):   n/a    Comments    2/16: Pt reports tolerating diet, appetite poor but improving, weight has been steady, agrees to vanilla Boost.    Anthropometrics    Height: 5' 8" (172.7 cm)    Last Weight: 70.1 kg (154 lb 8.7 oz) (02/11/23 1237) Weight Method: Bed Scale  BMI (Calculated): 23.5  BMI Classification: normal (BMI 18.5-24.9)        Ideal Body Weight (IBW), Male: 154 lb     % Ideal Body Weight, Male (lb): 100.35 %                          Usual Weight Provided By: EMR weight history    Wt Readings from Last 5 Encounters:   02/11/23 70.1 kg (154 lb 8.7 oz)   02/08/23 68 kg (150 lb)   06/24/20 66.1 kg (145 lb 11.6 oz)   06/24/20 66.1 kg (145 lb 11.6 oz)     Weight Change(s) Since Admission:  Admit Weight: 70.1 kg (154 lb 9.6 oz) (02/11/23 0237)      Estimated Needs     Energy: 3072-3243 kcals (25-30 kcals/kg CBW)   Protein: 84-98 g (1.2-1.4 g/kg CBW)    Fluid: 1938-8357 ml (1 ml/kcal)            Temp: 98.7 °F (37.1 °C)       Enteral Nutrition    Patient not receiving enteral nutrition at this time.    Parenteral Nutrition    Patient not receiving parenteral nutrition support at this time.    Evaluation of Received Nutrient Intake    Calories: not meeting estimated needs  Protein: not meeting estimated needs    Patient Education    Not applicable.    Nutrition Diagnosis     PES: Inadequate oral intake related to decreased appetite as evidenced by <50% po intake since admit. (new)    Interventions/Goals     Intervention(s): general/healthful diet, commercial beverage, prescription medication, and collaboration with other providers  Goal: Meet greater than 75% of nutritional needs by follow-up. (new)    Monitoring & Evaluation     Dietitian will monitor food and beverage intake, weight, " electrolyte/renal panel, and beliefs/attitudes.  Nutrition Risk/Follow-Up: moderate (follow-up in 3-5 days)   Please consult if re-assessment needed sooner.

## 2023-02-16 NOTE — PT/OT/SLP PROGRESS
Occupational Therapy   Treatment    Name: John Collado  MRN: [id number]  Admitting Diagnosis:  Fall       Recommendations:     Discharge Recommendations: nursing facility, basic, home health OT, home (family preference)  Discharge Equipment Recommendations:  shower chair  Barriers to discharge:   (pt needs 24/7 supervision due ot cognitive deficits)    Assessment:     John Collado is a 82 y.o. male with a medical diagnosis of Fall.   Performance deficits affecting function are impaired cognition, decreased safety awareness. OT present for discussion about d/c placement recommendations with patient's wife, daughter and CM. Patient is currently to high functional to d/c to SNF. He requires 24hr supervision due to cognitive deficits upon discharge. CM working on assisting family with placement options versus additional assistance at home to increase safety.     Rehab Prognosis:  Good; patient would benefit from acute skilled OT services to address these deficits and reach maximum level of function.       Plan:     Patient to be seen 5 x/week to address the above listed problems via self-care/home management, therapeutic activities, therapeutic exercises  Plan of Care Expires: 02/28/23  Plan of Care Reviewed with: patient    Subjective     Pain/Comfort:   0/10    Objective:     Communicated with: nrsg prior to session.  Patient found up in chair with mead catheter upon OT entry to room.    General Precautions: Standard, fall    Orthopedic Precautions:N/A  Braces: TLSO  Respiratory Status: Room air     Occupational Performance:         Functional Mobility/Transfers:  Patient completed Sit <> Stand Transfer with supervision     Functional Mobility: Patient ambulated to/from bathroom with supervision assistance.     Activities of Daily Living:  Grooming: stand by assistance to brush teeth. Verbal cues to locate items for task.       Treatment & Education:  spouse and daughter/s provided with verbal education regarding  discharge recommendations and patient current level of function.  Understanding was verbalized.     Patient left up in chair with  on, all lines intact, call button in reach, and family present outside of room with door open speaking with CM.     GOALS:   Multidisciplinary Problems       Occupational Therapy Goals          Problem: Occupational Therapy    Goal Priority Disciplines Outcome Interventions   Occupational Therapy Goal     OT, PT/OT Ongoing, Progressing    Description: Goals to be met by: 2/28/2023     Patient will increase functional independence with ADLs by performing:    LE Dressing with Supervision.  Grooming while standing at sink with Supervision.  Toileting from toilet with Supervision for hygiene and clothing management.   Bathing from  shower chair/bench with Supervision.  Toilet transfer to toilet with Supervision.                         Time Tracking:     OT Date of Treatment: 02/16/23  OT Start Time: 1016  OT Stop Time: 1040  OT Total Time (min): 24 min    Billable Minutes:Self Care/Home Management 24 min    OT/MAURO: OT     MAURO Visit Number: 2    2/16/2023

## 2023-02-17 LAB
ALBUMIN SERPL-MCNC: 3.4 G/DL (ref 3.4–4.8)
ALBUMIN/GLOB SERPL: 1.2 RATIO (ref 1.1–2)
ALP SERPL-CCNC: 64 UNIT/L (ref 40–150)
ALT SERPL-CCNC: 93 UNIT/L (ref 0–55)
AST SERPL-CCNC: 86 UNIT/L (ref 5–34)
BASOPHILS # BLD AUTO: 0.04 X10(3)/MCL (ref 0–0.2)
BASOPHILS NFR BLD AUTO: 0.5 %
BILIRUBIN DIRECT+TOT PNL SERPL-MCNC: 0.4 MG/DL
BUN SERPL-MCNC: 26.6 MG/DL (ref 8.4–25.7)
CALCIUM SERPL-MCNC: 9.1 MG/DL (ref 8.8–10)
CHLORIDE SERPL-SCNC: 111 MMOL/L (ref 98–107)
CO2 SERPL-SCNC: 22 MMOL/L (ref 23–31)
CREAT SERPL-MCNC: 1.59 MG/DL (ref 0.73–1.18)
DEPRECATED CALCIDIOL+CALCIFEROL SERPL-MC: 32.4 NG/ML (ref 30–80)
EOSINOPHIL # BLD AUTO: 0.25 X10(3)/MCL (ref 0–0.9)
EOSINOPHIL NFR BLD AUTO: 3.4 %
ERYTHROCYTE [DISTWIDTH] IN BLOOD BY AUTOMATED COUNT: 11.9 % (ref 11.5–17)
GFR SERPLBLD CREATININE-BSD FMLA CKD-EPI: 43 MLS/MIN/1.73/M2
GLOBULIN SER-MCNC: 2.9 GM/DL (ref 2.4–3.5)
GLUCOSE SERPL-MCNC: 100 MG/DL (ref 82–115)
HCT VFR BLD AUTO: 33.9 % (ref 42–52)
HGB BLD-MCNC: 11.5 GM/DL (ref 14–18)
IMM GRANULOCYTES # BLD AUTO: 0.06 X10(3)/MCL (ref 0–0.04)
IMM GRANULOCYTES NFR BLD AUTO: 0.8 %
LYMPHOCYTES # BLD AUTO: 1.29 X10(3)/MCL (ref 0.6–4.6)
LYMPHOCYTES NFR BLD AUTO: 17.6 %
MAGNESIUM SERPL-MCNC: 1.9 MG/DL (ref 1.6–2.6)
MCH RBC QN AUTO: 32.2 PG
MCHC RBC AUTO-ENTMCNC: 33.9 MG/DL (ref 33–36)
MCV RBC AUTO: 95 FL (ref 80–94)
MONOCYTES # BLD AUTO: 0.61 X10(3)/MCL (ref 0.1–1.3)
MONOCYTES NFR BLD AUTO: 8.3 %
NEUTROPHILS # BLD AUTO: 5.1 X10(3)/MCL (ref 2.1–9.2)
NEUTROPHILS NFR BLD AUTO: 69.4 %
NRBC BLD AUTO-RTO: 0 %
PLATELET # BLD AUTO: 307 X10(3)/MCL (ref 130–400)
PMV BLD AUTO: 9.3 FL (ref 7.4–10.4)
POTASSIUM SERPL-SCNC: 4.6 MMOL/L (ref 3.5–5.1)
PROT SERPL-MCNC: 6.3 GM/DL (ref 5.8–7.6)
RBC # BLD AUTO: 3.57 X10(6)/MCL (ref 4.7–6.1)
SODIUM SERPL-SCNC: 144 MMOL/L (ref 136–145)
WBC # SPEC AUTO: 7.4 X10(3)/MCL (ref 4.5–11.5)

## 2023-02-17 PROCEDURE — 83735 ASSAY OF MAGNESIUM: CPT | Performed by: INTERNAL MEDICINE

## 2023-02-17 PROCEDURE — 85025 COMPLETE CBC W/AUTO DIFF WBC: CPT | Performed by: INTERNAL MEDICINE

## 2023-02-17 PROCEDURE — 80053 COMPREHEN METABOLIC PANEL: CPT | Performed by: INTERNAL MEDICINE

## 2023-02-17 PROCEDURE — 25000003 PHARM REV CODE 250: Performed by: NURSE PRACTITIONER

## 2023-02-17 PROCEDURE — 63600175 PHARM REV CODE 636 W HCPCS: Performed by: NURSE PRACTITIONER

## 2023-02-17 PROCEDURE — 25000003 PHARM REV CODE 250: Performed by: INTERNAL MEDICINE

## 2023-02-17 PROCEDURE — 27000207 HC ISOLATION

## 2023-02-17 PROCEDURE — 21400001 HC TELEMETRY ROOM

## 2023-02-17 PROCEDURE — 63600175 PHARM REV CODE 636 W HCPCS: Performed by: INTERNAL MEDICINE

## 2023-02-17 PROCEDURE — 97535 SELF CARE MNGMENT TRAINING: CPT

## 2023-02-17 PROCEDURE — 82306 VITAMIN D 25 HYDROXY: CPT | Performed by: INTERNAL MEDICINE

## 2023-02-17 RX ORDER — QUETIAPINE FUMARATE 25 MG/1
75 TABLET, FILM COATED ORAL
Status: DISCONTINUED | OUTPATIENT
Start: 2023-02-17 | End: 2023-02-21

## 2023-02-17 RX ORDER — HYDROXYZINE PAMOATE 25 MG/1
25 CAPSULE ORAL
Status: DISCONTINUED | OUTPATIENT
Start: 2023-02-18 | End: 2023-02-23 | Stop reason: HOSPADM

## 2023-02-17 RX ADMIN — SENNOSIDES AND DOCUSATE SODIUM 2 TABLET: 50; 8.6 TABLET ORAL at 09:02

## 2023-02-17 RX ADMIN — THERA TABS 1 TABLET: TAB at 09:02

## 2023-02-17 RX ADMIN — ACETAMINOPHEN 1000 MG: 500 TABLET, FILM COATED ORAL at 09:02

## 2023-02-17 RX ADMIN — MEMANTINE 10 MG: 5 TABLET ORAL at 09:02

## 2023-02-17 RX ADMIN — TAMSULOSIN HYDROCHLORIDE 0.4 MG: 0.4 CAPSULE ORAL at 09:02

## 2023-02-17 RX ADMIN — HALOPERIDOL LACTATE 2 MG: 5 INJECTION, SOLUTION INTRAMUSCULAR at 01:02

## 2023-02-17 RX ADMIN — ACETAMINOPHEN 1000 MG: 500 TABLET, FILM COATED ORAL at 04:02

## 2023-02-17 RX ADMIN — ENOXAPARIN SODIUM 40 MG: 40 INJECTION SUBCUTANEOUS at 04:02

## 2023-02-17 RX ADMIN — Medication 6 MG: at 08:02

## 2023-02-17 RX ADMIN — TRAMADOL HYDROCHLORIDE 50 MG: 50 TABLET, COATED ORAL at 09:02

## 2023-02-17 RX ADMIN — QUETIAPINE FUMARATE 50 MG: 25 TABLET ORAL at 08:02

## 2023-02-17 RX ADMIN — FINASTERIDE 5 MG: 5 TABLET, FILM COATED ORAL at 09:02

## 2023-02-17 RX ADMIN — SERTRALINE HYDROCHLORIDE 50 MG: 50 TABLET ORAL at 09:02

## 2023-02-17 NOTE — PT/OT/SLP PROGRESS
Physical Therapy      Patient Name:  John Collado   MRN:  [id number]    Patient not seen today secondary to Other (Comment) (Atttempted family training per MD request but family not present.). Will follow-up as schedule allows.

## 2023-02-17 NOTE — PT/OT/SLP PROGRESS
Occupational Therapy   Treatment    Name: John Collado  MRN: [id number]  Admitting Diagnosis:  Fall       Recommendations:     Discharge Recommendations: nursing facility, basic  Discharge Equipment Recommendations:   (TBD by next level of care)  Barriers to discharge:   (24hr supervision)    Assessment:     John Collado is a 82 y.o. male with a medical diagnosis of Fall.      Family training provided on proper donning of TLSO and safe mobility with patient. They would benefit from one more session.     Performance deficits affecting function are decreased safety awareness, impaired cognition.     Rehab Prognosis:  Good; patient would benefit from acute skilled OT services to address these deficits and reach maximum level of function.       Plan:     Patient to be seen 5 x/week to address the above listed problems via self-care/home management, therapeutic activities  Plan of Care Expires: 02/28/23  Plan of Care Reviewed with: patient    Subjective     Pain/Comfort:  Pain Rating 1: 0/10    Objective:     Communicated with: nrsg prior to session.  Patient found HOB elevated with mead catheter upon OT entry to room.    General Precautions: Standard, fall    Orthopedic Precautions:N/A  Braces: TLSO  Respiratory Status: Room air       Occupational Performance:     Bed Mobility:    Patient completed Supine to Sit with supervision  Patient completed Sit to Supine with supervision     Functional Mobility/Transfers:  Patient completed Sit <> Stand Transfer with supervision    Functional Mobility: Patient ambulated with supervision and no AD    Activities of Daily Living:  Grooming: supervision to wash hands      Family Education:  Spouse provided with verbal and demonstration education regarding TLSO donning.  Patient was able to return demonstration with min A and mod verbal cues. Would benefit from additional instruction.     spouse and family provided with verbal and demonstration education regarding safe mobility  with patient within room.  Understanding was verbalized. Family given gait belt.       Patient left HOB elevated with all lines intact and call button in reach    GOALS:   Multidisciplinary Problems       Occupational Therapy Goals          Problem: Occupational Therapy    Goal Priority Disciplines Outcome Interventions   Occupational Therapy Goal     OT, PT/OT Ongoing, Progressing    Description: Goals to be met by: 2/28/2023     Patient will increase functional independence with ADLs by performing:    LE Dressing with Supervision.  Grooming while standing at sink with Supervision.  Toileting from toilet with Supervision for hygiene and clothing management.   Bathing from  shower chair/bench with Supervision.  Toilet transfer to toilet with Supervision.                         Time Tracking:     OT Date of Treatment: 02/17/23  OT Start Time: 1548  OT Stop Time: 1620  OT Total Time (min): 32 min    Billable Minutes:Self Care/Home Management 32 min    OT/MAURO: OT     MAURO Visit Number: 3    2/17/2023

## 2023-02-17 NOTE — PHYSICIAN QUERY
PT Name: John Collado  MR #: [id number]    DOCUMENTATION CLARIFICATION   Mei Bravo RN, CCDS    [email or web address]    Documentation Excellence  This form is a permanent document in the medical record.     Query Date: February 17, 2023    By submitting this query, we are merely seeking further clarification of documentation.   Please utilize your independent clinical judgment when addressing the question(s) below.    The Medical Record contains the following:   Indicators   Supporting Clinical Findings Location in Medical Record   x AMS, Confusion,  LOC, etc.  Acute encephalopathy/delirium on dementia Hosp PN 2/13 - 17   x Acute/Chronic Illness Fall & req falls; known L1 fx from 2/8  w/[place]en LSO brace in place    Closed compression fracture of body of L1 vertebra    Dementia    TEJAS  CKD  Bradycardia  Dementia    Urine Retention with TEJAS 2/2 Intravascular Depletion    NAGMA     Normocytic anemia    Covid 19 infection    Dementia with behavioral disturbances/psychosis   -ed md          H&P        Hosp PN 2/11    HOsp PN 2/12    Hosp PN 2/13 2/13 psy    x Radiology Findings CTH 2/10/23  Chronic age-related changes.  No acute process CT    Electrolyte Imbalance      Medication     x Treatment         CTH  Psy CN    1.) move Seroquel to 8pm (recently increased), increase Melatonin 6mg po 8pm (monitor sleep) move zoloft to AM  2.) cont sitter at BS/1:1 for safety    Recommendations:   1.) cont current psych med management, monitor sleep/RTIS eduard. Encourage family at BS for re-orientation and redirection as appropriate (requiring frequently at present)  2.) psych cont to follow   Orders      Psy cn 2/13 2/14 psy cn   x Other Family reports encouraging pt's pain management more often, he appears less restless and agitated after taking analgesics but he usually denies pain when prompted.     Per nurse less flailing arms and RTIS, appears to be experiencing less hallucinations and disorganized thought  content. Able to be redirected an reoriented briefly.  2/14 MATHEW HERNANDEZ     The noted clinical guidelines are only system guidelines and do not replace the providers clinical judgment.    The National [place] of Neurologic Disorders and Stroke (NINDS) of the NIH describes encephalopathy as any diffuse disease of the brain that alters brain function or structure.    Provider,     - 2 part question -     Part 1 of 2:  Specify encephalopathy type -   [   ] Metabolic Encephalopathy - Due to electrolyte imbalance, metabolic derangements, or infectious processes, includes Septic Encephalopathy, Uremic Encephalopathy   [   ] Encephalopathy, unspecified      [   ] Other Encephalopathy (please specify): ____________________   [   ] Other neurological condition- Includes Post-ictal altered mental status (please specify condition): __________   [ x  ]  Clinically Undetermined     Part 2 of 2:  Specify Present on Admission (POA) Status:  [   ] Yes (Y)   [   ] No (N)   [   ] Clinically Undetermined (W)   [ x  ] Documentation insufficient to determine if condition is POA (U)     Please document in your progress notes daily for the duration of treatment until resolved, and include in your discharge summary.    References:  MARY JANE Thrasher RN, CCDS. (2018, June 9). Notes from the Instructor: Encephalopathy tips. Retrieved October 22, 2020, from [email or web address]    ICD-9-CM Coding Clinic First Quarter 2013, Effective with discharges: October 21, 2013 [facility] § Seizure with encephalopathy due to postictal state (2013).    ICD-10-CM/[employer] Integrated Codebook (Version V.20.8.10.0) [Computer software]. (2020). Retrieved October 21, 2020.    National [place] of Neurological Disorders and Stroke. (2019, March 27). Retrieved October 22, 2020, from https://www.ninds.nih.gov/Disorders/All-Disorders/Bbbhqbsevzhoux-Fabltbnnurm-Xtuv    Form No. 17166

## 2023-02-17 NOTE — PROGRESS NOTES
[facility] Hospital Medicine Progress Note        Chief Complaint: Inpatient Follow-up for     HPI:   Hospital course reviewed    Interval Hx:     Afebrile overnight.  Remains disoriented, confused.  Sitter was at bedside overnight.  Patient could not sleep all night.  Grandson's wife was at bedside this morning.  Labs from this morning showing stable hemoglobin platelets with minimally elevated BUN and serum creatinine from yesterday.    Objective/physical exam:  Vitals:    02/16/23 2100 02/16/23 2300 02/17/23 0300 02/17/23 0700   BP: 138/74 (!) 127/56 (!) 142/67 125/61   Pulse: 67 65 73 70   Resp: 20 20 18 18   Temp: 97.9 °F (36.6 °C) 97.5 °F (36.4 °C) 97.5 °F (36.4 °C) 97.5 °F (36.4 °C)   TempSrc: Oral Axillary Axillary Oral   SpO2: 98% 95% 96% 96%   Weight:       Height:         General: In no acute distress, afebrile  Respiratory: Clear to auscultation bilaterally  Cardiovascular: S1, S2, no appreciable murmur  Abdomen: Soft, nontender, BS +  MSK: Warm, no lower extremity edema, no clubbing or cyanosis  Neurologic: Alert and oriented x0, moving all extremities with good strength     Lab Results   Component Value Date     02/17/2023    K 4.6 02/17/2023    CO2 22 (L) 02/17/2023    BUN 26.6 (H) 02/17/2023    CREATININE 1.59 (H) 02/17/2023    CALCIUM 9.1 02/17/2023    EGFRNONAA 46 07/31/2021      Lab Results   Component Value Date    ALT 93 (H) 02/17/2023    AST 86 (H) 02/17/2023    ALKPHOS 64 02/17/2023    BILITOT 0.4 02/17/2023      Lab Results   Component Value Date    WBC 7.4 02/17/2023    HGB 11.5 (L) 02/17/2023    HCT 33.9 (L) 02/17/2023    MCV 95.0 (H) 02/17/2023     02/17/2023           Medications:   acetaminophen  1,000 mg Oral TID    enoxaparin  40 mg Subcutaneous Daily    finasteride  5 mg Oral Daily    melatonin  6 mg Oral Nightly    memantine  10 mg Oral BID    multivitamin  1 tablet Oral Daily    QUEtiapine  50 mg Oral Q24H    senna-docusate 8.6-50 mg  2  tablet Oral QHS    sertraline  50 mg Oral Daily    tamsulosin  1 capsule Oral BID      acetaminophen, albuterol, aluminum-magnesium hydroxide-simethicone, benzonatate, haloperidol lactate, hydrALAZINE, melatonin, morphine, ondansetron, polyethylene glycol, prochlorperazine, senna-docusate 8.6-50 mg, simethicone, sodium chloride 0.9%, traMADoL     Assessment/Plan:    Recurrent falls  Bilateral hip pain/osteoarthritis   L1 compression fracture  Acute urinary retention needing mead  Post renal Acute kidney injury on CKD   COVID-19 infection  ( vaccinated)  Acute encephalopathy/delirium on dementia - improving      Plan:   -increase Seroquel.  Continue sertraline.  We will consider Psychiatry evaluation if necessary.  Continue sitter at bedside   -plan to discontinue Mead catheter tomorrow.  Urology recommendations noted.  Continue Flomax and finasteride.  Needs outpatient urology follow up.  - stable from COVID-19 standpoint.  Doing well on room air.  Monitor for now.  DC isolation tomorrow  -Therapy .  Encourage p.o. intake.needs placement  -Other home medications were reviewed.  Continue memantine     Lovenox             Gadiel Rome MD

## 2023-02-18 LAB
ANION GAP SERPL CALC-SCNC: 10 MEQ/L
BASOPHILS # BLD AUTO: 0.05 X10(3)/MCL (ref 0–0.2)
BASOPHILS NFR BLD AUTO: 1 %
BUN SERPL-MCNC: 22.5 MG/DL (ref 8.4–25.7)
CALCIUM SERPL-MCNC: 8.7 MG/DL (ref 8.8–10)
CHLORIDE SERPL-SCNC: 111 MMOL/L (ref 98–107)
CO2 SERPL-SCNC: 22 MMOL/L (ref 23–31)
CREAT SERPL-MCNC: 1.39 MG/DL (ref 0.73–1.18)
CREAT/UREA NIT SERPL: 16
EOSINOPHIL # BLD AUTO: 0.25 X10(3)/MCL (ref 0–0.9)
EOSINOPHIL NFR BLD AUTO: 5.1 %
ERYTHROCYTE [DISTWIDTH] IN BLOOD BY AUTOMATED COUNT: 12 % (ref 11.5–17)
GFR SERPLBLD CREATININE-BSD FMLA CKD-EPI: 51 MLS/MIN/1.73/M2
GLUCOSE SERPL-MCNC: 94 MG/DL (ref 82–115)
HCT VFR BLD AUTO: 33.1 % (ref 42–52)
HGB BLD-MCNC: 11.4 G/DL (ref 14–18)
IMM GRANULOCYTES # BLD AUTO: 0.06 X10(3)/MCL (ref 0–0.04)
IMM GRANULOCYTES NFR BLD AUTO: 1.2 %
LYMPHOCYTES # BLD AUTO: 1.49 X10(3)/MCL (ref 0.6–4.6)
LYMPHOCYTES NFR BLD AUTO: 30.7 %
MAGNESIUM SERPL-MCNC: 2 MG/DL (ref 1.6–2.6)
MCH RBC QN AUTO: 32.3 PG
MCHC RBC AUTO-ENTMCNC: 34.4 G/DL (ref 33–36)
MCV RBC AUTO: 93.8 FL (ref 80–94)
MONOCYTES # BLD AUTO: 0.53 X10(3)/MCL (ref 0.1–1.3)
MONOCYTES NFR BLD AUTO: 10.9 %
NEUTROPHILS # BLD AUTO: 2.48 X10(3)/MCL (ref 2.1–9.2)
NEUTROPHILS NFR BLD AUTO: 51.1 %
NRBC BLD AUTO-RTO: 0 %
PLATELET # BLD AUTO: 293 X10(3)/MCL (ref 130–400)
PMV BLD AUTO: 9.4 FL (ref 7.4–10.4)
POTASSIUM SERPL-SCNC: 3.9 MMOL/L (ref 3.5–5.1)
RBC # BLD AUTO: 3.53 X10(6)/MCL (ref 4.7–6.1)
SODIUM SERPL-SCNC: 143 MMOL/L (ref 136–145)
WBC # SPEC AUTO: 4.9 X10(3)/MCL (ref 4.5–11.5)

## 2023-02-18 PROCEDURE — 25000003 PHARM REV CODE 250: Performed by: NURSE PRACTITIONER

## 2023-02-18 PROCEDURE — 25000003 PHARM REV CODE 250: Performed by: INTERNAL MEDICINE

## 2023-02-18 PROCEDURE — 85025 COMPLETE CBC W/AUTO DIFF WBC: CPT | Performed by: INTERNAL MEDICINE

## 2023-02-18 PROCEDURE — 63600175 PHARM REV CODE 636 W HCPCS: Performed by: INTERNAL MEDICINE

## 2023-02-18 PROCEDURE — 83735 ASSAY OF MAGNESIUM: CPT | Performed by: INTERNAL MEDICINE

## 2023-02-18 PROCEDURE — 21400001 HC TELEMETRY ROOM

## 2023-02-18 PROCEDURE — 80048 BASIC METABOLIC PNL TOTAL CA: CPT | Performed by: INTERNAL MEDICINE

## 2023-02-18 RX ADMIN — FINASTERIDE 5 MG: 5 TABLET, FILM COATED ORAL at 10:02

## 2023-02-18 RX ADMIN — SERTRALINE HYDROCHLORIDE 50 MG: 50 TABLET ORAL at 10:02

## 2023-02-18 RX ADMIN — HYDROXYZINE PAMOATE 25 MG: 25 CAPSULE ORAL at 11:02

## 2023-02-18 RX ADMIN — MEMANTINE 10 MG: 5 TABLET ORAL at 10:02

## 2023-02-18 RX ADMIN — THERA TABS 1 TABLET: TAB at 10:02

## 2023-02-18 RX ADMIN — ENOXAPARIN SODIUM 40 MG: 40 INJECTION SUBCUTANEOUS at 04:02

## 2023-02-18 RX ADMIN — SENNOSIDES AND DOCUSATE SODIUM 2 TABLET: 50; 8.6 TABLET ORAL at 09:02

## 2023-02-18 RX ADMIN — TAMSULOSIN HYDROCHLORIDE 0.4 MG: 0.4 CAPSULE ORAL at 09:02

## 2023-02-18 RX ADMIN — Medication 6 MG: at 09:02

## 2023-02-18 RX ADMIN — MEMANTINE 10 MG: 5 TABLET ORAL at 09:02

## 2023-02-18 RX ADMIN — TAMSULOSIN HYDROCHLORIDE 0.4 MG: 0.4 CAPSULE ORAL at 10:02

## 2023-02-18 RX ADMIN — ACETAMINOPHEN 1000 MG: 500 TABLET, FILM COATED ORAL at 10:02

## 2023-02-18 RX ADMIN — TRAMADOL HYDROCHLORIDE 50 MG: 50 TABLET, COATED ORAL at 09:02

## 2023-02-18 RX ADMIN — ACETAMINOPHEN 1000 MG: 500 TABLET, FILM COATED ORAL at 04:02

## 2023-02-18 RX ADMIN — ACETAMINOPHEN 1000 MG: 500 TABLET, FILM COATED ORAL at 09:02

## 2023-02-18 RX ADMIN — QUETIAPINE FUMARATE 75 MG: 25 TABLET ORAL at 09:02

## 2023-02-18 NOTE — PROGRESS NOTES
[facility] Hospital Medicine Progress Note        Chief Complaint: Inpatient Follow-up for     HPI:   Hospital course reviewed    Interval Hx:   Seen examined at bedside.  Comfortably resting.  Had good night sleep.  Sitter was continued overnight.  Doing well on room air.  Tolerating diet, physical therapy and moving bowels.  Psychiatric adjusted medications yesterday.      Objective/physical exam:  Vitals:    02/17/23 1900 02/17/23 2101 02/18/23 0300 02/18/23 0715   BP: 124/77  (!) 147/63 137/71   Pulse: 73  62 (!) 55   Resp: 18 16 17 18   Temp: 98.8 °F (37.1 °C)  98.7 °F (37.1 °C) 97.9 °F (36.6 °C)   TempSrc: Oral  Axillary    SpO2: 95%  95% 97%   Weight:       Height:         General: In no acute distress, afebrile  Respiratory: Clear to auscultation bilaterally  Cardiovascular: S1, S2, no appreciable murmur  Abdomen: Soft, nontender, BS +  MSK: Warm, no lower extremity edema, no clubbing or cyanosis  Neurologic:  Resting in the bed, limited exam     Lab Results   Component Value Date     02/18/2023    K 3.9 02/18/2023    CO2 22 (L) 02/18/2023    BUN 22.5 02/18/2023    CREATININE 1.39 (H) 02/18/2023    CALCIUM 8.7 (L) 02/18/2023    EGFRNONAA 46 07/31/2021      Lab Results   Component Value Date    ALT 93 (H) 02/17/2023    AST 86 (H) 02/17/2023    ALKPHOS 64 02/17/2023    BILITOT 0.4 02/17/2023      Lab Results   Component Value Date    WBC 4.9 02/18/2023    HGB 11.4 (L) 02/18/2023    HCT 33.1 (L) 02/18/2023    MCV 93.8 02/18/2023     02/18/2023           Medications:   acetaminophen  1,000 mg Oral TID    enoxaparin  40 mg Subcutaneous Daily    finasteride  5 mg Oral Daily    hydrOXYzine pamoate  25 mg Oral Daily with lunch    melatonin  6 mg Oral Nightly    memantine  10 mg Oral BID    multivitamin  1 tablet Oral Daily    QUEtiapine  75 mg Oral Q24H    senna-docusate 8.6-50 mg  2 tablet Oral QHS    sertraline  50 mg Oral Daily    tamsulosin  1 capsule Oral BID       acetaminophen, albuterol, aluminum-magnesium hydroxide-simethicone, benzonatate, haloperidol lactate, hydrALAZINE, morphine, ondansetron, polyethylene glycol, prochlorperazine, senna-docusate 8.6-50 mg, simethicone, sodium chloride 0.9%, traMADoL     Assessment/Plan:    Recurrent falls  Bilateral hip pain/osteoarthritis   L1 compression fracture  Acute urinary retention needing mead-discontinue  Post renal Acute kidney injury on CKD   COVID-19 infection  ( vaccinated)  Acute encephalopathy/delirium on dementia - improving      Plan:   -psychiatry adjusted regimen.  Appreciate assistance.  Patient's family requesting memory care unit placement  -will discontinue sitter.  Continue to monitor with fall precautions.  -Mead discontinued.  Continue Flomax and finasteride.  Needs outpatient urology follow-up   -stable from COVID-19 standpoint.  DC isolation.  -continue therapy.  Other home medications were reviewed.  Continue memantine     Lovenox           Gadiel Rome MD

## 2023-02-18 NOTE — PSYCH
[facility] - 9th Floor Med Surg  Consult Note  Psychiatry        Referring Physician: Carrol James MD    Examiner: Ana Cristina Nunez NP  Date: 2/17/2023  Patient Status: Inpt  patient seen individually, chart reviewed, and case discussed with staff    C/M, daughter at BS. Family concerned at rapid decline over past week eduard. Was walking 3 miles a week right before admission. Today has been less agitated compared to yesterday but was exhibiting some irritability earlier in the day. Took a brief nap in the middle of the day and appears to have woken up in a better mood. Sitter at BS. Poor sleep last night. Appetite fair with encouragement. Compliant with meds PO with reminders. Redirectable at times. Appears less preoccupied with disorganized thoughts, decreased RTIS eduard.    Mental Status Exam:     Eye Contact: fair    Attitude toward examiner: cooperative and with prompting    Motor Activity: agitated    Speech: minimal and hesitant but gets talkative when prompted    Cognitions: oriented x 2 and requires re-orientation    PERCEPTIONS: denies hallucinations and questionable lingering psychosis    THOUGHT PROCESSES: distractibility and disorganized    THOUGHT CONTENT: DENIES suicidal ideation and DENIES homicidal ideation    AFFECT/MOOD: blunted    INSIGHT: poor    JUDGEMENT: limited      Recommendations:  1.) increase PM Seroquel 75mg po QHS, hydroxyzine pamoate 25mg po Q12PM, monitor midday agitation  2.) cont sitter/1:1 safety  3.) placement as appropriate/per family request  4.) psych sign off, reconsult as necessary

## 2023-02-19 PROCEDURE — 25000003 PHARM REV CODE 250: Performed by: INTERNAL MEDICINE

## 2023-02-19 PROCEDURE — 11000001 HC ACUTE MED/SURG PRIVATE ROOM

## 2023-02-19 PROCEDURE — 63600175 PHARM REV CODE 636 W HCPCS: Performed by: INTERNAL MEDICINE

## 2023-02-19 PROCEDURE — 21400001 HC TELEMETRY ROOM

## 2023-02-19 PROCEDURE — 25000003 PHARM REV CODE 250: Performed by: NURSE PRACTITIONER

## 2023-02-19 RX ADMIN — FINASTERIDE 5 MG: 5 TABLET, FILM COATED ORAL at 09:02

## 2023-02-19 RX ADMIN — THERA TABS 1 TABLET: TAB at 09:02

## 2023-02-19 RX ADMIN — QUETIAPINE FUMARATE 75 MG: 25 TABLET ORAL at 07:02

## 2023-02-19 RX ADMIN — ACETAMINOPHEN 1000 MG: 500 TABLET, FILM COATED ORAL at 04:02

## 2023-02-19 RX ADMIN — TAMSULOSIN HYDROCHLORIDE 0.4 MG: 0.4 CAPSULE ORAL at 09:02

## 2023-02-19 RX ADMIN — ENOXAPARIN SODIUM 40 MG: 40 INJECTION SUBCUTANEOUS at 04:02

## 2023-02-19 RX ADMIN — MEMANTINE 10 MG: 5 TABLET ORAL at 07:02

## 2023-02-19 RX ADMIN — TRAMADOL HYDROCHLORIDE 50 MG: 50 TABLET, COATED ORAL at 07:02

## 2023-02-19 RX ADMIN — SENNOSIDES AND DOCUSATE SODIUM 2 TABLET: 50; 8.6 TABLET ORAL at 07:02

## 2023-02-19 RX ADMIN — SENNOSIDES AND DOCUSATE SODIUM 2 TABLET: 50; 8.6 TABLET ORAL at 09:02

## 2023-02-19 RX ADMIN — TAMSULOSIN HYDROCHLORIDE 0.4 MG: 0.4 CAPSULE ORAL at 07:02

## 2023-02-19 RX ADMIN — HYDROXYZINE PAMOATE 25 MG: 25 CAPSULE ORAL at 12:02

## 2023-02-19 RX ADMIN — ACETAMINOPHEN 1000 MG: 500 TABLET, FILM COATED ORAL at 07:02

## 2023-02-19 RX ADMIN — MEMANTINE 10 MG: 5 TABLET ORAL at 09:02

## 2023-02-19 RX ADMIN — SERTRALINE HYDROCHLORIDE 50 MG: 50 TABLET ORAL at 09:02

## 2023-02-19 RX ADMIN — Medication 6 MG: at 07:02

## 2023-02-19 NOTE — PROGRESS NOTES
[facility] Hospital Medicine Progress Note        Chief Complaint: Inpatient Follow-up for     HPI:   Hospital course reviewed    Interval Hx:   Hemodynamically stable.  Doing well on room air.  He was alert, comfortably resting.  Participate in conversations but disoriented to person and time.  Sitter was continued overnight.  There was episode of confusion due to delay in Seroquel otherwise no new mental status changes.  Objective/physical exam:  Vitals:    02/18/23 2045 02/18/23 2109 02/18/23 2115 02/19/23 0700   BP:   (!) 104/58 129/68   Pulse:   75 63   Resp:  20 20    Temp: 98 °F (36.7 °C)  98 °F (36.7 °C) 97.5 °F (36.4 °C)   TempSrc:   Oral Oral   SpO2:   96% 99%   Weight:       Height:         General: In no acute distress, afebrile  Respiratory: Clear to auscultation bilaterally  Cardiovascular: S1, S2, no appreciable murmur  Abdomen: Soft, nontender, BS +  MSK: Warm, no lower extremity edema, no clubbing or cyanosis  Neurologic:  Resting in the bed, limited exam     Lab Results   Component Value Date     02/18/2023    K 3.9 02/18/2023    CO2 22 (L) 02/18/2023    BUN 22.5 02/18/2023    CREATININE 1.39 (H) 02/18/2023    CALCIUM 8.7 (L) 02/18/2023    EGFRNONAA 46 07/31/2021      Lab Results   Component Value Date    ALT 93 (H) 02/17/2023    AST 86 (H) 02/17/2023    ALKPHOS 64 02/17/2023    BILITOT 0.4 02/17/2023      Lab Results   Component Value Date    WBC 4.9 02/18/2023    HGB 11.4 (L) 02/18/2023    HCT 33.1 (L) 02/18/2023    MCV 93.8 02/18/2023     02/18/2023           Medications:   acetaminophen  1,000 mg Oral TID    enoxaparin  40 mg Subcutaneous Daily    finasteride  5 mg Oral Daily    hydrOXYzine pamoate  25 mg Oral Daily with lunch    melatonin  6 mg Oral Nightly    memantine  10 mg Oral BID    multivitamin  1 tablet Oral Daily    QUEtiapine  75 mg Oral Q24H    senna-docusate 8.6-50 mg  2 tablet Oral QHS    sertraline  50 mg Oral Daily    tamsulosin  1  capsule Oral BID      acetaminophen, albuterol, aluminum-magnesium hydroxide-simethicone, benzonatate, haloperidol lactate, hydrALAZINE, morphine, ondansetron, polyethylene glycol, prochlorperazine, senna-docusate 8.6-50 mg, simethicone, sodium chloride 0.9%, traMADoL     Assessment/Plan:    Recurrent falls  Bilateral hip pain/osteoarthritis   L1 compression fracture  Acute urinary retention needing mead-discontinue  Post renal Acute kidney injury on CKD   COVID-19 infection  ( vaccinated)  Acute encephalopathy/delirium on dementia - improving      Plan:   -psychiatry adjusted regimen.  Appreciate assistance.  Patient's family requesting memory care unit placement  -will discontinue sitter tomorrow.  Continue to monitor with fall precautions.  -Mead discontinued.  Continue Flomax and finasteride.  Needs outpatient urology follow-up   -stable from COVID-19 standpoint.  DC'ed isolation.  -continue therapy.  Other home medications were reviewed.  Continue memantine   -PT    Lovenox           Gadiel Rome MD

## 2023-02-19 NOTE — PLAN OF CARE
Problem: Infection  Goal: Absence of Infection Signs and Symptoms  Outcome: Ongoing, Not Progressing     Problem: Adult Inpatient Plan of Care  Goal: Plan of Care Review  Outcome: Ongoing, Not Progressing  Goal: Patient-Specific Goal (Individualized)  Outcome: Ongoing, Not Progressing  Goal: Absence of Hospital-Acquired Illness or Injury  Outcome: Ongoing, Not Progressing  Goal: Optimal Comfort and Wellbeing  Outcome: Ongoing, Not Progressing  Goal: Readiness for Transition of Care  Outcome: Ongoing, Not Progressing     Problem: Skin Injury Risk Increased  Goal: Skin Health and Integrity  Outcome: Ongoing, Not Progressing     Problem: Fall Injury Risk  Goal: Absence of Fall and Fall-Related Injury  Outcome: Ongoing, Not Progressing     Problem: Pain Acute  Goal: Acceptable Pain Control and Functional Ability  Outcome: Ongoing, Not Progressing

## 2023-02-20 PROCEDURE — 25000003 PHARM REV CODE 250: Performed by: NURSE PRACTITIONER

## 2023-02-20 PROCEDURE — 21400001 HC TELEMETRY ROOM

## 2023-02-20 PROCEDURE — 11000001 HC ACUTE MED/SURG PRIVATE ROOM

## 2023-02-20 PROCEDURE — 97116 GAIT TRAINING THERAPY: CPT | Mod: CQ

## 2023-02-20 PROCEDURE — 97530 THERAPEUTIC ACTIVITIES: CPT | Mod: CQ

## 2023-02-20 PROCEDURE — 25000003 PHARM REV CODE 250: Performed by: INTERNAL MEDICINE

## 2023-02-20 PROCEDURE — 97535 SELF CARE MNGMENT TRAINING: CPT

## 2023-02-20 PROCEDURE — 63600175 PHARM REV CODE 636 W HCPCS: Performed by: INTERNAL MEDICINE

## 2023-02-20 RX ADMIN — TRAMADOL HYDROCHLORIDE 50 MG: 50 TABLET, COATED ORAL at 08:02

## 2023-02-20 RX ADMIN — FINASTERIDE 5 MG: 5 TABLET, FILM COATED ORAL at 08:02

## 2023-02-20 RX ADMIN — SENNOSIDES AND DOCUSATE SODIUM 2 TABLET: 50; 8.6 TABLET ORAL at 08:02

## 2023-02-20 RX ADMIN — TAMSULOSIN HYDROCHLORIDE 0.4 MG: 0.4 CAPSULE ORAL at 08:02

## 2023-02-20 RX ADMIN — Medication 6 MG: at 08:02

## 2023-02-20 RX ADMIN — ACETAMINOPHEN 1000 MG: 500 TABLET, FILM COATED ORAL at 04:02

## 2023-02-20 RX ADMIN — HYDROXYZINE PAMOATE 25 MG: 25 CAPSULE ORAL at 11:02

## 2023-02-20 RX ADMIN — ACETAMINOPHEN 1000 MG: 500 TABLET, FILM COATED ORAL at 10:02

## 2023-02-20 RX ADMIN — SERTRALINE HYDROCHLORIDE 50 MG: 50 TABLET ORAL at 08:02

## 2023-02-20 RX ADMIN — THERA TABS 1 TABLET: TAB at 09:02

## 2023-02-20 RX ADMIN — ACETAMINOPHEN 1000 MG: 500 TABLET, FILM COATED ORAL at 08:02

## 2023-02-20 RX ADMIN — MEMANTINE 10 MG: 5 TABLET ORAL at 08:02

## 2023-02-20 RX ADMIN — QUETIAPINE FUMARATE 75 MG: 25 TABLET ORAL at 08:02

## 2023-02-20 RX ADMIN — ENOXAPARIN SODIUM 40 MG: 40 INJECTION SUBCUTANEOUS at 04:02

## 2023-02-20 NOTE — PT/OT/SLP DISCHARGE
Physical Therapy Discharge Summary    Name: John Collado  MRN: [id number]   Principal Problem: Fall     Patient Discharged from acute Physical Therapy on 23.  Please refer to prior PT noted date on 23 for functional status.     Assessment:     Patient has met all goals and is not appropriate for therapy.    Objective:     GOALS:   Multidisciplinary Problems       Physical Therapy Goals          Problem: Physical Therapy    Goal Priority Disciplines Outcome Goal Variances Interventions   Physical Therapy Goal     PT, PT/OT Ongoing, Progressing     Description: Goals to be met by: 2023     Patient will increase functional independence with mobility by performin. Supine to sit with Modified [place]  2. Sit to supine with Modified [place]  3. Bed to chair transfer with Modified [place] using No Assistive Device  4. Gait  x 400 feet with [place] using No Assistive Device.                          Reasons for Discontinuation of Therapy Services  Satisfactory goal achievement.      Plan:     Patient Discharged to:  care home NH .      2023

## 2023-02-20 NOTE — PT/OT/SLP PROGRESS
Physical Therapy Treatment    Patient Name:  John Collado   MRN:  [id number]    Recommendations:     Discharge Recommendations: nursing facility, basic  Discharge Equipment Recommendations: walker, rolling  Barriers to discharge: Decreased caregiver support    Assessment:     John Collado is a 82 y.o. male admitted with a medical diagnosis of Fall.  He presents with the following impairments/functional limitations: decreased safety awareness, impaired cognition, orthopedic precautions .    Rehab Prognosis: Good; patient would benefit from acute skilled PT services to address these deficits and reach maximum level of function.    Recent Surgery: * No surgery found *      Plan:     During this hospitalization, patient to be seen 6 x/week to address the identified rehab impairments via gait training, therapeutic activities and progress toward the following goals:    Plan of Care Expires:       Subjective     Chief Complaint:   Patient/Family Comments/goals:   Pain/Comfort:         Objective:     Communicated with nurse prior to session.  Patient found HOB elevated with pulse ox (continuous), telemetry, peripheral IV upon PT entry to room.     General Precautions: Standard,    Orthopedic Precautions:    Braces: TLSO  Respiratory Status: Room air  Blood Pressure:   Skin Integrity: Visible skin intact      Functional Mobility:  Bed Mobility:     Supine to Sit: modified independence  Sit to Supine: modified independence  Transfers:     Sit to Stand:  modified independence with no AD  Gait: pt amb x750ft with no AD and nno LOB noted        Education:  daughter/s provided with verbal and demonstration education regarding donning TLSO, safety awareness during mobility and answered all questions.  Understanding was verbalized.     Patient left HOB elevated with all lines intact and call button in reach.. Discussed POC with PT.     GOALS:   Multidisciplinary Problems       Physical Therapy Goals          Problem: Physical  Therapy    Goal Priority Disciplines Outcome Goal Variances Interventions   Physical Therapy Goal     PT, PT/OT Ongoing, Progressing     Description: Goals to be met by: 2023     Patient will increase functional independence with mobility by performin. Supine to sit with Modified [place]  2. Sit to supine with Modified [place]  3. Bed to chair transfer with Modified [place] using No Assistive Device  4. Gait  x 400 feet with [place] using No Assistive Device.                          Time Tracking:     PT Received On: 23  PT Start Time: 1343     PT Stop Time: 1406  PT Total Time (min): 23 min     Billable Minutes: Gait Training 13 and Therapeutic Activity 10    Treatment Type: Treatment  PT/PTA: PTA     PTA Visit Number: 5     2023

## 2023-02-20 NOTE — PT/OT/SLP PROGRESS
Occupational Therapy   Treatment    Name: John Collado  MRN: [id number]  Admitting Diagnosis:  Fall       Recommendations:     Discharge Recommendations: nursing facility, basic  Discharge Equipment Recommendations:   (TBD by next level of care)  Barriers to discharge:   (24hr supervision)    Assessment:     John Collado is a 82 y.o. male with a medical diagnosis of Fall.  Patient has met all goals. Performance deficits affecting function are decreased safety awareness.     Rehab Prognosis:  Good; patient would benefit from acute skilled OT services to address these deficits and reach maximum level of function.       Plan:     Patient has met all goals. OT to sign off. Discussed with family.    Subjective     Pain/Comfort:  Pain Rating 1: 0/10    Objective:     Communicated with: nrsg prior to session.  Patient found HOB elevated with mead catheter upon OT entry to room.    General Precautions: Standard, fall    Orthopedic Precautions:N/A  Braces: TLSO  Respiratory Status: Room air     Occupational Performance:     Bed Mobility:    Patient completed Supine to Sit with independence  Patient completed Sit to Supine with independence     Functional Mobility/Transfers:  Patient completed Sit <> Stand Transfer with supervision  with  no assistive device   Patient completed Toilet Transfer Step Transfer technique with supervision with  no AD  Functional Mobility: Patient ambulated 200ft with SUP and no AD.     Activities of Daily Living:  Lower Body Dressing: supervision to julio shoes  Toileting: supervision to attempted void standing      Treatment & Education:  family provided with verbal education regarding OT POC.  Understanding was verbalized.     Patient left HOB elevated with all lines intact, call button in reach, and family present    GOALS:   Multidisciplinary Problems       Occupational Therapy Goals       Not on file              Multidisciplinary Problems (Resolved)          Problem: Occupational Therapy     Goal Priority Disciplines Outcome Interventions   Occupational Therapy Goal   (Resolved)     OT, PT/OT Met    Description: Goals to be met by: 2/28/2023     Patient will increase functional independence with ADLs by performing:    LE Dressing with Supervision.  Grooming while standing at sink with Supervision.  Toileting from toilet with Supervision for hygiene and clothing management.   Bathing from  shower chair/bench with Supervision.  Toilet transfer to toilet with Supervision.                         Time Tracking:     OT Date of Treatment: 02/20/23  OT Start Time: 1343  OT Stop Time: 1410  OT Total Time (min): 27 min    Billable Minutes:Self Care/Home Management 27 min    OT/MAURO: OT     MAURO Visit Number: 4    2/20/2023

## 2023-02-20 NOTE — PT/OT/SLP DISCHARGE
Occupational Therapy Discharge Summary    John Collado  MRN: [id number]   Principal Problem: Fall      Patient Discharged from acute Occupational Therapy on 2/20/2023.  Please refer to prior OT note dated 2/20/2023 for functional status.    Assessment:      Patient has met all goals and is not appropriate for therapy. Discussed this with family. Family training provided for mobility within room.    Objective:     GOALS:   Multidisciplinary Problems       Occupational Therapy Goals       Not on file              Multidisciplinary Problems (Resolved)          Problem: Occupational Therapy    Goal Priority Disciplines Outcome Interventions   Occupational Therapy Goal   (Resolved)     OT, PT/OT Met    Description: Goals to be met by: 2/28/2023     Patient will increase functional independence with ADLs by performing:    LE Dressing with Supervision.  Grooming while standing at sink with Supervision.  Toileting from toilet with Supervision for hygiene and clothing management.   Bathing from  shower chair/bench with Supervision.  Toilet transfer to toilet with Supervision.                         Reasons for Discontinuation of Therapy Services  Therapist determines that the patient will no longer benefit from therapy services.      Plan:     Patient Discharged to:  basic nursing facility    2/20/2023

## 2023-02-20 NOTE — PLAN OF CARE
Problem: Occupational Therapy  Goal: Occupational Therapy Goal  Description: Goals to be met by: 2/28/2023     Patient will increase functional independence with ADLs by performing:    LE Dressing with Supervision.  Grooming while standing at sink with Supervision.  Toileting from toilet with Supervision for hygiene and clothing management.   Bathing from  shower chair/bench with Supervision.  Toilet transfer to toilet with Supervision.    Outcome: Met

## 2023-02-20 NOTE — PROGRESS NOTES
Inpatient Nutrition Assessment    Admit Date: 2/10/2023   Total duration of encounter: 10 days     Nutrition Recommendation/Prescription     Continue regular diet as tolerated.    Continue vanilla boost Plus TID. 360 kcals and 14 g protein per serving.     Consider appetite stimulant if no improvement in po intake soon.     Communication of Recommendations: reviewed with patient/caregiver and reviewed with nurse    Nutrition Assessment     Malnutrition Assessment/Nutrition-Focused Physical Exam      Degree of Malnutrition: does not meet criteria  Energy Intake: </= 50% of estimated energy requirement for >/= 5 days  Interpretation of Weight Loss: does not meet criteria  Body Fat:does not meet criteria  Area of Body Fat Loss: does not meet criteria  Muscle Mass Loss: does not meet criteria  Area of Muscle Mass Loss: does not meet criteria  Fluid Accumulation: does not meet criteria  Edema: does not meet criteria   Reduced  Strength: unable to obtain  A minimum of two characteristics is recommended for diagnosis of either severe or non-severe malnutrition.    Chart Review    Reason Seen: length of stay    Malnutrition Screening Tool Results   Have you recently lost weight without trying?: No  Have you been eating poorly because of a decreased appetite?: No   MST Score: 0     Diagnosis:  Recurrent falls  Bilateral hip pain/osteoarthritis   L1 compression fracture  Acute urinary retention needing mead  Post renal Acute kidney injury on CKD   COVID-19 infection  ( vaccinated)  Acute encephalopathy/delirium on dementia - improving    Relevant Medical History: dementia    Nutrition-Related Medications: MVI, Senna-Docusate  Calorie Containing IV Medications: no significant kcals from medications at this time    Nutrition-Related Labs:  2/14: Cl 112, Cr 1.29, Glu 80, Ca 8.5     Diet/PN Order: Diet Adult Regular  Oral Supplement Order: Boost Plus  Tube Feeding Order: none  Appetite/Oral Intake: good/50-75% of  "meals  Factors Affecting Nutritional Intake: impaired cognitive status/motor control and decreased appetite  Food/[employer]/Cultural Preferences: none reported  Food Allergies: none reported    Skin Integrity: bruised (ecchymotic)  Wound(s):   n/a    Comments    2/16: Pt reports tolerating diet, appetite poor but improving, weight has been steady, agrees to vanilla Boost.    2/20:  sitter in room reports pt ate most of his breakfast this morning; appetite improving    Anthropometrics    Height: 5' 8" (172.7 cm)    Last Weight: 70.1 kg (154 lb 8.7 oz) (02/11/23 1237) Weight Method: Bed Scale  BMI (Calculated): 23.5  BMI Classification: normal (BMI 18.5-24.9)        Ideal Body Weight (IBW), Male: 154 lb     % Ideal Body Weight, Male (lb): 100.35 %                          Usual Weight Provided By: EMR weight history    Wt Readings from Last 5 Encounters:   02/11/23 70.1 kg (154 lb 8.7 oz)   02/08/23 68 kg (150 lb)   06/24/20 66.1 kg (145 lb 11.6 oz)   06/24/20 66.1 kg (145 lb 11.6 oz)     Weight Change(s) Since Admission:  Admit Weight: 70.1 kg (154 lb 9.6 oz) (02/11/23 0237)      Estimated Needs     Energy: 1047-1676 kcals (25-30 kcals/kg CBW)   Protein: 84-98 g (1.2-1.4 g/kg CBW)    Fluid: 9857-2764 ml (1 ml/kcal)            Temp: 97.5 °F (36.4 °C)       Enteral Nutrition    Patient not receiving enteral nutrition at this time.    Parenteral Nutrition    Patient not receiving parenteral nutrition support at this time.    Evaluation of Received Nutrient Intake    Calories: not meeting estimated needs  Protein: not meeting estimated needs    Patient Education    Not applicable.    Nutrition Diagnosis     PES: Inadequate oral intake related to decreased appetite as evidenced by <50% po intake since admit. (improving)    Interventions/Goals     Intervention(s): general/healthful diet, commercial beverage, prescription medication, and collaboration with other providers  Goal: Meet greater than 75% of nutritional needs by " follow-up. (goal progressing)    Monitoring & Evaluation     Dietitian will monitor food and beverage intake, weight, electrolyte/renal panel, and beliefs/attitudes.  Nutrition Risk/Follow-Up: moderate (follow-up in 3-5 days)   Please consult if re-assessment needed sooner.

## 2023-02-20 NOTE — PROGRESS NOTES
[facility] Hospital Medicine Progress Note        Chief Complaint: Inpatient Follow-up for     HPI:   Hospital course reviewed    Interval Hx:   Afebrile overnight.  Doing well on room air.  He was alert and oriented x3 today.  Family member was at bedside.  He had no new complaints or concerns.  Ambulating well with support.  Short duration of confusion was observed yesterday evening.  Otherwise no acute events.        Objective/physical exam:  Vitals:    02/19/23 1944 02/19/23 2300 02/20/23 0400 02/20/23 0700   BP:  111/69 110/70 113/61   Pulse:  60 60 64   Resp: 18 18 18    Temp:  98.2 °F (36.8 °C) 98.2 °F (36.8 °C) 98.4 °F (36.9 °C)   TempSrc:  Oral  Oral   SpO2:  99% 98% 97%   Weight:       Height:         General: In no acute distress, afebrile  Respiratory: Clear to auscultation bilaterally  Cardiovascular: S1, S2, no appreciable murmur  Abdomen: Soft, nontender, BS +  MSK: Warm, no lower extremity edema, no clubbing or cyanosis  Neurologic:  A/O x3, intact strength in all extremities.      Lab Results   Component Value Date     02/18/2023    K 3.9 02/18/2023    CO2 22 (L) 02/18/2023    BUN 22.5 02/18/2023    CREATININE 1.39 (H) 02/18/2023    CALCIUM 8.7 (L) 02/18/2023    EGFRNONAA 46 07/31/2021      Lab Results   Component Value Date    ALT 93 (H) 02/17/2023    AST 86 (H) 02/17/2023    ALKPHOS 64 02/17/2023    BILITOT 0.4 02/17/2023      Lab Results   Component Value Date    WBC 4.9 02/18/2023    HGB 11.4 (L) 02/18/2023    HCT 33.1 (L) 02/18/2023    MCV 93.8 02/18/2023     02/18/2023           Medications:   acetaminophen  1,000 mg Oral TID    enoxaparin  40 mg Subcutaneous Daily    finasteride  5 mg Oral Daily    hydrOXYzine pamoate  25 mg Oral Daily with lunch    melatonin  6 mg Oral Nightly    memantine  10 mg Oral BID    multivitamin  1 tablet Oral Daily    QUEtiapine  75 mg Oral Q24H    senna-docusate 8.6-50 mg  2 tablet Oral QHS    sertraline  50 mg Oral  Daily    tamsulosin  1 capsule Oral BID      acetaminophen, albuterol, aluminum-magnesium hydroxide-simethicone, benzonatate, haloperidol lactate, hydrALAZINE, morphine, ondansetron, polyethylene glycol, prochlorperazine, senna-docusate 8.6-50 mg, simethicone, sodium chloride 0.9%, traMADoL     Assessment/Plan:    Recurrent falls  Bilateral hip pain/osteoarthritis   L1 compression fracture  Acute urinary retention needing mead-discontinued  Post renal Acute kidney injury on CKD   COVID-19 infection  ( vaccinated)  Acute encephalopathy/delirium on dementia - improving      Plan:   -psychiatry adjusted regimen.  Appreciate assistance.  Patient's family requesting memory care unit placement  -will continue sitter .  Continue fall precautions   -Mead discontinued.  Continue Flomax and finasteride.  Needs outpatient urology follow-up   -stable from COVID-19 standpoint.  DC'ed isolation.  -continue therapy.  Other home medications were reviewed.  Continue memantine   -PT    Lovenox           Gadiel Rome MD

## 2023-02-21 PROCEDURE — 25000003 PHARM REV CODE 250: Performed by: NURSE PRACTITIONER

## 2023-02-21 PROCEDURE — 25000003 PHARM REV CODE 250: Performed by: INTERNAL MEDICINE

## 2023-02-21 PROCEDURE — 63600175 PHARM REV CODE 636 W HCPCS: Performed by: INTERNAL MEDICINE

## 2023-02-21 PROCEDURE — 11000001 HC ACUTE MED/SURG PRIVATE ROOM

## 2023-02-21 PROCEDURE — 21400001 HC TELEMETRY ROOM

## 2023-02-21 PROCEDURE — 94761 N-INVAS EAR/PLS OXIMETRY MLT: CPT

## 2023-02-21 RX ORDER — QUETIAPINE FUMARATE 25 MG/1
75 TABLET, FILM COATED ORAL
Status: DISCONTINUED | OUTPATIENT
Start: 2023-02-21 | End: 2023-02-23 | Stop reason: HOSPADM

## 2023-02-21 RX ORDER — TALC
6 POWDER (GRAM) TOPICAL
Status: DISCONTINUED | OUTPATIENT
Start: 2023-02-21 | End: 2023-02-23 | Stop reason: HOSPADM

## 2023-02-21 RX ADMIN — MEMANTINE 10 MG: 5 TABLET ORAL at 07:02

## 2023-02-21 RX ADMIN — SENNOSIDES AND DOCUSATE SODIUM 2 TABLET: 50; 8.6 TABLET ORAL at 05:02

## 2023-02-21 RX ADMIN — THERA TABS 1 TABLET: TAB at 08:02

## 2023-02-21 RX ADMIN — Medication 6 MG: at 07:02

## 2023-02-21 RX ADMIN — SERTRALINE HYDROCHLORIDE 50 MG: 50 TABLET ORAL at 08:02

## 2023-02-21 RX ADMIN — TAMSULOSIN HYDROCHLORIDE 0.4 MG: 0.4 CAPSULE ORAL at 08:02

## 2023-02-21 RX ADMIN — ACETAMINOPHEN 1000 MG: 500 TABLET, FILM COATED ORAL at 11:02

## 2023-02-21 RX ADMIN — MEMANTINE 10 MG: 5 TABLET ORAL at 08:02

## 2023-02-21 RX ADMIN — POLYETHYLENE GLYCOL 3350 17 G: 17 POWDER, FOR SOLUTION ORAL at 05:02

## 2023-02-21 RX ADMIN — ENOXAPARIN SODIUM 40 MG: 40 INJECTION SUBCUTANEOUS at 05:02

## 2023-02-21 RX ADMIN — TAMSULOSIN HYDROCHLORIDE 0.4 MG: 0.4 CAPSULE ORAL at 07:02

## 2023-02-21 RX ADMIN — SENNOSIDES AND DOCUSATE SODIUM 2 TABLET: 50; 8.6 TABLET ORAL at 07:02

## 2023-02-21 RX ADMIN — QUETIAPINE FUMARATE 75 MG: 25 TABLET ORAL at 07:02

## 2023-02-21 RX ADMIN — TRAMADOL HYDROCHLORIDE 50 MG: 50 TABLET, COATED ORAL at 08:02

## 2023-02-21 RX ADMIN — ACETAMINOPHEN 1000 MG: 500 TABLET, FILM COATED ORAL at 05:02

## 2023-02-21 RX ADMIN — ACETAMINOPHEN 1000 MG: 500 TABLET, FILM COATED ORAL at 07:02

## 2023-02-21 RX ADMIN — FINASTERIDE 5 MG: 5 TABLET, FILM COATED ORAL at 08:02

## 2023-02-21 RX ADMIN — HYDROXYZINE PAMOATE 25 MG: 25 CAPSULE ORAL at 11:02

## 2023-02-21 NOTE — NURSING
"daughter came to nurses station @ 1940 twice now  (back to back) upset and aggressive, even coming behind the plexiglass to address nurse because "a couple nights ago a nurse gave 10pm meds at 0730" and that "worked better". explained I can only give scheduled meds an hour ahead; was talking about how he won't last another 20 minutes etc. and does not want him haldol'ed because trying to get placement etc. told her I understand all of that but also have to legally abide by MD orders/protocols/policies. she said Dr. Rome said he would schedule them for 0730 pm tonight and argued with me that it is scheduled for that time currently. showed her the chart/ they are schedule for 9 and 10 pm, and she got upset. apologized to her that he told her one thing and didn't follow through, but I couldn't pull med out of machine until 8pm (an hour ahead); not satisfied. to appease her told her I would notify night hospitalist, but that by the time they can respond it will be 8p, and he will be getting his meds any how... notified Consuelo Barriga NP and she approved for me to give them early. See eMAR for administration details. When entered the room, pt was very pleasant and cooperative. I did not assess any of the behavioral concerns daughter was referring to during my HTT assessment. Daughter was apologetic. Notified her I would put a chart note to notified MD that she wants his night meds passed every night at 1930. Also left a note on whiteboard and encouraged her to re-discuss with day time hospitalist.   "

## 2023-02-21 NOTE — PSYCH
"[facility] - 9th Floor Med Surg  Consult Note  Psychiatry        Referring Physician: Carrol James MD    Examiner: Ana Cristina Nunez NP  Date: 2/20/2023  Patient Status: Inpt  patient seen individually, chart reviewed, and case discussed with staff    "I'm doing fine."  C/M, sleeping and appetite better compared to before. Has been up and walking down [place] earlier today. Compliant with meds PO without incident. Looks and asks family for orientation questions. "Wednesday, August 2022". Denies SI/HI, denies AH/VH. Family reports no more recent RTIS or psychosis like before. Better energy during the day. PT/OT recently released him per family. Has back brace for support/spine fracture, has to be worn at all times when out of the bed.  Family feels better about improving ambulation/strength. Still concerned with poor orientation/memory complication. Family feels more willing to proceed with SNF/rehab placement for continued care, would like to do OP mental health if possible.    Mental Status Exam:     Eye Contact: fair    Attitude toward examiner: cooperative    Motor Activity: normal    Speech: loud, pressured, and abrupt, sometimes inappropriate    Cognitions: oriented x 2    PERCEPTIONS: denies hallucinations    THOUGHT PROCESSES: distractibility and disorganized    THOUGHT CONTENT: DENIES suicidal ideation and DENIES homicidal ideation    AFFECT/MOOD: anxious    INSIGHT: poor    JUDGEMENT: limited and questionable    Recommendations:   1.) cont. Current POC  2.) f/u behavioral health IOP/PHP as available wherever placed  3.) psych sign off, reconsult as neccessary  "

## 2023-02-22 PROCEDURE — 25000003 PHARM REV CODE 250: Performed by: NURSE PRACTITIONER

## 2023-02-22 PROCEDURE — 11000001 HC ACUTE MED/SURG PRIVATE ROOM

## 2023-02-22 PROCEDURE — 25000003 PHARM REV CODE 250: Performed by: INTERNAL MEDICINE

## 2023-02-22 PROCEDURE — 21400001 HC TELEMETRY ROOM

## 2023-02-22 RX ORDER — AMOXICILLIN 250 MG
2 CAPSULE ORAL DAILY PRN
Status: DISCONTINUED | OUTPATIENT
Start: 2023-02-22 | End: 2023-02-23 | Stop reason: HOSPADM

## 2023-02-22 RX ORDER — MEMANTINE HYDROCHLORIDE 5 MG/1
10 TABLET ORAL 2 TIMES DAILY
Status: DISCONTINUED | OUTPATIENT
Start: 2023-02-22 | End: 2023-02-23 | Stop reason: HOSPADM

## 2023-02-22 RX ORDER — TAMSULOSIN HYDROCHLORIDE 0.4 MG/1
1 CAPSULE ORAL 2 TIMES DAILY
Status: DISCONTINUED | OUTPATIENT
Start: 2023-02-22 | End: 2023-02-23 | Stop reason: HOSPADM

## 2023-02-22 RX ADMIN — TAMSULOSIN HYDROCHLORIDE 0.4 MG: 0.4 CAPSULE ORAL at 08:02

## 2023-02-22 RX ADMIN — QUETIAPINE FUMARATE 75 MG: 25 TABLET ORAL at 06:02

## 2023-02-22 RX ADMIN — ACETAMINOPHEN 1000 MG: 500 TABLET, FILM COATED ORAL at 06:02

## 2023-02-22 RX ADMIN — HYDROXYZINE PAMOATE 25 MG: 25 CAPSULE ORAL at 12:02

## 2023-02-22 RX ADMIN — MEMANTINE 10 MG: 5 TABLET ORAL at 08:02

## 2023-02-22 RX ADMIN — SERTRALINE HYDROCHLORIDE 50 MG: 50 TABLET ORAL at 08:02

## 2023-02-22 RX ADMIN — Medication 6 MG: at 08:02

## 2023-02-22 RX ADMIN — FINASTERIDE 5 MG: 5 TABLET, FILM COATED ORAL at 08:02

## 2023-02-22 RX ADMIN — THERA TABS 1 TABLET: TAB at 08:02

## 2023-02-22 RX ADMIN — ACETAMINOPHEN 1000 MG: 500 TABLET, FILM COATED ORAL at 10:02

## 2023-02-22 RX ADMIN — MEMANTINE 10 MG: 5 TABLET ORAL at 06:02

## 2023-02-22 RX ADMIN — TAMSULOSIN HYDROCHLORIDE 0.4 MG: 0.4 CAPSULE ORAL at 06:02

## 2023-02-22 NOTE — PROGRESS NOTES
[facility] Hospital Medicine Progress Note        Chief Complaint: Inpatient Follow-up for     HPI:   Mr Collado is a 82-year-old gentleman with Dementia and recently diagnosed L1 Compression Fracture on 02/08/22 presents to the ED with complaints of left hip pain after sustaining another ground level fall yesterday evening. Pt daughter at bedside, gives report. Pt lives with wife and is independent of functional ADLs. Pt wife was present during initial fall and there was no head trauma or LOC however details of fall yesterday are unclear. Pt with baseline bradycardia per daughter, stating that he runs 1 miles daily up until a few days ago.      Initial VS showed Temperature 97.3°, heart rate 64, respirations 18, blood pressure 149/77.  While in the ED patient with HR in the 50s.  X-ray of the hip was negative for acute fractures.  Due to his repeated falls he is being admitted to the hospitalist service for PT evaluation & Rehab placement. He also had an mild TEJAS. PT/OT consulted.  Mcfadden was inserted due to retention and Urology was consulted, recommended continuing Flomax with finasteride.  He was diagnosed with COVID-19 pneumonitis and remained stable respiratory standpoint.  Isolation was discontinued after 5 days. Psychiatry was consulted for delirium on dementia, and medications were adjusted.  Sitter was placed at bedside.  Therapy was continued.    Interval Hx:   No acute events overnight.  Sitter will be discontinued today.  When seen at bedside patient was alert, comfortably resting in the chair.  Wife and daughter was at bedside.  Ambulating well.  No episodes of confusion and agitation were reported.      Objective/physical exam:  Vitals:    02/21/23 1500 02/21/23 1900 02/21/23 2300 02/22/23 0300   BP: 108/66 (!) 108/52 (!) 102/56 131/60   BP Location: Left arm      Patient Position: Lying      Pulse: 71 65 (!) 58 (!) 59   Resp: 18 16 16 18   Temp: 97.5 °F (36.4 °C) 97.6 °F  (36.4 °C) 98.8 °F (37.1 °C) 97.8 °F (36.6 °C)   TempSrc: Oral Oral Oral Oral   SpO2: 98% 98% 95% 98%   Weight:       Height:         General: In no acute distress, afebrile  Respiratory: Clear to auscultation bilaterally  Cardiovascular: S1, S2, no appreciable murmur  Abdomen: Soft, nontender, BS +  MSK: Warm, no lower extremity edema, no clubbing or cyanosis  Neurologic:  A/O x3, intact strength in all extremities.      Lab Results   Component Value Date     02/18/2023    K 3.9 02/18/2023    CO2 22 (L) 02/18/2023    BUN 22.5 02/18/2023    CREATININE 1.39 (H) 02/18/2023    CALCIUM 8.7 (L) 02/18/2023    EGFRNONAA 46 07/31/2021      Lab Results   Component Value Date    ALT 93 (H) 02/17/2023    AST 86 (H) 02/17/2023    ALKPHOS 64 02/17/2023    BILITOT 0.4 02/17/2023      Lab Results   Component Value Date    WBC 4.9 02/18/2023    HGB 11.4 (L) 02/18/2023    HCT 33.1 (L) 02/18/2023    MCV 93.8 02/18/2023     02/18/2023           Medications:   acetaminophen  1,000 mg Oral TID    enoxaparin  40 mg Subcutaneous Daily    finasteride  5 mg Oral Daily    hydrOXYzine pamoate  25 mg Oral Daily with lunch    melatonin  6 mg Oral Q24H    memantine  10 mg Oral BID    multivitamin  1 tablet Oral Daily    QUEtiapine  75 mg Oral Q24H    senna-docusate 8.6-50 mg  2 tablet Oral QHS    sertraline  50 mg Oral Daily    tamsulosin  1 capsule Oral BID      acetaminophen, albuterol, aluminum-magnesium hydroxide-simethicone, benzonatate, haloperidol lactate, hydrALAZINE, morphine, ondansetron, polyethylene glycol, prochlorperazine, senna-docusate 8.6-50 mg, simethicone, sodium chloride 0.9%, traMADoL     Assessment/Plan:    Recurrent falls  Bilateral hip pain/osteoarthritis   L1 compression fracture  Acute urinary retention needing mead-discontinued  Post renal Acute kidney injury on CKD   COVID-19 infection  ( vaccinated)  Acute encephalopathy/delirium on dementia - improving      Plan:   -psychiatry adjusted regimen.  Signed  off.  Appreciate assistance. will discontinue sitter .  Continue fall precautions   -Mcfadden discontinued.  Continue Flomax and finasteride.  Needs outpatient urology follow-up   -stable from COVID-19 standpoint.  DC'ed isolation.  -continue therapy.  Other home medications were reviewed.  Continue memantine   -PT  -DC to home with home health, home therapy tomorrow.  Prefers meds to bed at discharge    Isha Rome MD

## 2023-02-23 VITALS
TEMPERATURE: 98 F | HEART RATE: 64 BPM | OXYGEN SATURATION: 99 % | BODY MASS INDEX: 23.43 KG/M2 | DIASTOLIC BLOOD PRESSURE: 55 MMHG | SYSTOLIC BLOOD PRESSURE: 100 MMHG | WEIGHT: 154.56 LBS | HEIGHT: 68 IN | RESPIRATION RATE: 18 BRPM

## 2023-02-23 PROCEDURE — 25000003 PHARM REV CODE 250: Performed by: INTERNAL MEDICINE

## 2023-02-23 PROCEDURE — 25000003 PHARM REV CODE 250: Performed by: NURSE PRACTITIONER

## 2023-02-23 RX ORDER — TRAMADOL HYDROCHLORIDE 50 MG/1
50 TABLET ORAL EVERY 6 HOURS PRN
Qty: 28 TABLET | Refills: 0 | Status: SHIPPED | OUTPATIENT
Start: 2023-02-23 | End: 2023-03-02

## 2023-02-23 RX ORDER — ALBUTEROL SULFATE 90 UG/1
2 AEROSOL, METERED RESPIRATORY (INHALATION) EVERY 4 HOURS PRN
Qty: 6.7 G | Refills: 0 | Status: SHIPPED | OUTPATIENT
Start: 2023-02-23

## 2023-02-23 RX ORDER — TRAMADOL HYDROCHLORIDE 50 MG/1
50 TABLET ORAL EVERY 6 HOURS PRN
Qty: 28 TABLET | Refills: 0 | Status: SHIPPED | OUTPATIENT
Start: 2023-02-23 | End: 2023-02-23 | Stop reason: SDUPTHER

## 2023-02-23 RX ORDER — QUETIAPINE FUMARATE 50 MG/1
75 TABLET, FILM COATED ORAL NIGHTLY
Qty: 45 TABLET | Refills: 11 | Status: SHIPPED | OUTPATIENT
Start: 2023-02-23 | End: 2024-02-23

## 2023-02-23 RX ORDER — HYDROXYZINE HYDROCHLORIDE 25 MG/1
25 TABLET, FILM COATED ORAL
Qty: 30 TABLET | Refills: 0 | Status: SHIPPED | OUTPATIENT
Start: 2023-02-23 | End: 2023-03-25

## 2023-02-23 RX ORDER — LIDOCAINE 50 MG/G
1 PATCH TOPICAL DAILY
Qty: 7 PATCH | Refills: 0 | Status: SHIPPED | OUTPATIENT
Start: 2023-02-23 | End: 2023-03-02

## 2023-02-23 RX ADMIN — THERA TABS 1 TABLET: TAB at 08:02

## 2023-02-23 RX ADMIN — FINASTERIDE 5 MG: 5 TABLET, FILM COATED ORAL at 08:02

## 2023-02-23 RX ADMIN — TAMSULOSIN HYDROCHLORIDE 0.4 MG: 0.4 CAPSULE ORAL at 08:02

## 2023-02-23 RX ADMIN — MEMANTINE 10 MG: 5 TABLET ORAL at 08:02

## 2023-02-23 RX ADMIN — SERTRALINE HYDROCHLORIDE 50 MG: 50 TABLET ORAL at 08:02

## 2023-02-23 NOTE — DISCHARGE SUMMARY
[facility]  Hospital Medicine Discharge Summary    Admit Date: 2/10/2023  Discharge Date and Time: 2/23/20235:11 PM  Admitting Physician:  Team  Discharging Physician: Stephanie Cool MD.  Primary Care Physician: John Meyers MD  Consults: Hospital Medicine    Discharge Diagnoses:  Recurrent falls  Bilateral hip pain/osteoarthritis   L1 compression fracture  Acute urinary retention needing mead-discontinued  Post renal Acute kidney injury on CKD   COVID-19 infection  ( vaccinated)  Acute encephalopathy/delirium on dementia - improving       Hospital Course:   Chief Complaint: Inpatient Follow-up for      HPI:   Mr Collado is a 82-year-old gentleman with Dementia and recently diagnosed L1 Compression Fracture on 02/08/22 presents to the ED with complaints of left hip pain after sustaining another ground level fall yesterday evening. Pt daughter at bedside, gives report. Pt lives with wife and is independent of functional ADLs. Pt wife was present during initial fall and there was no head trauma or LOC however details of fall yesterday are unclear. Pt with baseline bradycardia per daughter, stating that he runs 1 miles daily up until a few days ago.      Initial VS showed Temperature 97.3°, heart rate 64, respirations 18, blood pressure 149/77.  While in the ED patient with HR in the 50s.  X-ray of the hip was negative for acute fractures.  Due to his repeated falls he is being admitted to the hospitalist service for PT evaluation & Rehab placement. He also had an mild TEJAS. PT/OT consulted.  Mead was inserted due to retention and Urology was consulted, recommended continuing Flomax with finasteride.  He was diagnosed with COVID-19 pneumonitis and remained stable respiratory standpoint.  Isolation was discontinued after 5 days. Psychiatry was consulted for delirium on dementia, and medications were adjusted.  Sitter was placed at bedside.  Therapy was continued.       Patient was  managed for COVID-19 infection.  Completed treatment and is on room air.  Patient will be discharged home with PT services  He will follow-up with Urology outpatient for acute urinary retention.     exam     General: In no acute distress, afebrile  Respiratory: Clear to auscultation bilaterally  Cardiovascular: S1, S2, no appreciable murmur  Abdomen: Soft, nontender, BS +  MSK: Warm, no lower extremity edema, no clubbing or cyanosis  Neurologic:  A/O x3, intact strength in all extremities.       Pt was seen and examined on the day of discharge  Vitals:  VITAL SIGNS: 24 HRS MIN & MAX LAST   Temp  Min: 98 °F (36.7 °C)  Max: 98.1 °F (36.7 °C) 98 °F (36.7 °C)   BP  Min: 100/55  Max: 101/65 (!) 100/55     Pulse  Min: 59  Max: 64  64   Resp  Min: 16  Max: 18 18   SpO2  Min: 99 %  Max: 99 % 99 %       Procedures Performed: No admission procedures for hospital encounter.     Significant Diagnostic Studies: See Full reports for all details    Recent Labs   Lab 02/17/23  0456 02/18/23  0522   WBC 7.4 4.9   RBC 3.57* 3.53*   HGB 11.5* 11.4*   HCT 33.9* 33.1*   MCV 95.0* 93.8   MCH 32.2 32.3   MCHC 33.9 34.4   RDW 11.9 12.0    293   MPV 9.3 9.4       Recent Labs   Lab 02/17/23  0456 02/18/23  0521    143   K 4.6 3.9   CO2 22* 22*   BUN 26.6* 22.5   CREATININE 1.59* 1.39*   CALCIUM 9.1 8.7*   MG 1.90 2.00   ALBUMIN 3.4  --    ALKPHOS 64  --    ALT 93*  --    AST 86*  --    BILITOT 0.4  --         Microbiology Results (last 7 days)       ** No results found for the last 168 hours. **             CV Ultrasound Bilateral Doppler Carotid  The right internal carotid artery demonstrated less than 50% stenosis.  The left internal carotid artery demonstrated less than 50% stenosis.  Bilateral vertebral arteries were patent with antegrade flow.  X-Ray Chest 1 View  Narrative: EXAMINATION:  XR CHEST 1 VIEW    CLINICAL HISTORY:  cough, covid;    TECHNIQUE:  Single view of the chest    COMPARISON:  02/12/2023    FINDINGS:  No  focal opacification.  No pleural effusion or pneumothorax.  No acute osseous abnormality.  Impression: No acute cardiopulmonary process.    Electronically signed by: Roque Ma  Date:    02/13/2023  Time:    07:14         Medication List        START taking these medications      albuterol 90 mcg/actuation inhaler  Commonly known as: PROVENTIL/VENTOLIN HFA  Inhale 2 puffs into the lungs every 4 (four) hours as needed for Wheezing or Shortness of Breath. Rescue     hydrOXYzine HCL 25 MG tablet  Commonly known as: ATARAX  Take 1 tablet (25 mg total) by mouth with lunch.     QUEtiapine 50 MG tablet  Commonly known as: SEROQUEL  Take 1.5 tablets (75 mg total) by mouth every evening.     traMADoL 50 mg tablet  Commonly known as: ULTRAM  Take 1 tablet (50 mg total) by mouth every 6 (six) hours as needed for Pain.            CONTINUE taking these medications      ascorbic acid (vitamin C) 500 MG tablet  Commonly known as: VITAMIN C     finasteride 5 mg tablet  Commonly known as: PROSCAR     fluticasone propionate 50 mcg/actuation nasal spray  Commonly known as: FLONASE     LIDOcaine 5 %  Commonly known as: LIDODERM  Place 1 patch onto the skin once daily. Remove & Discard patch within 12 hours or as directed by MD for 7 days     memantine 10 MG Tab  Commonly known as: NAMENDA     multivitamin with folic acid 400 mcg Tab     sertraline 50 MG tablet  Commonly known as: ZOLOFT     tamsulosin 0.4 mg Cap  Commonly known as: FLOMAX            STOP taking these medications      cyclobenzaprine 10 MG tablet  Commonly known as: FLEXERIL     HYDROcodone-acetaminophen 5-325 mg per tablet  Commonly known as: NORCO               Where to Get Your Medications        These medications were sent to [facility] - [address]  [address] [facility] [address] [address] [address]      Phone: [phone]   albuterol 90 mcg/actuation inhaler  hydrOXYzine HCL 25 MG  tablet  LIDOcaine 5 %  QUEtiapine 50 MG tablet       You can get these medications from any pharmacy    Bring a paper prescription for each of these medications  traMADoL 50 mg tablet          Explained in detail to the patient about the discharge plan, medications, and follow-up visits. Pt understands and agrees with the treatment plan  Discharge Disposition: Home-Health Care [facility]   Discharged Condition: stable  Diet-    Medications Per DC med rec  Activities as tolerated   Follow-up Information       John Meyers MD. Schedule an appointment as soon as possible for a visit in 2 week(s).    Specialty: Family Medicine  Why: Follow up 03/09/2023 @9:40a.m.  Contact information:  [address] [facility] [address] [phone]               [facility] Follow up.    Specialties: Home Health Services, Home Therapy Services, Home Living Aide Services  Contact information:  [facility] [id number] [phone]                         For further questions contact hospitalist office    Discharge time 33 minutes    For worsening symptoms, chest pain, shortness of breath, increased abdominal pain, high grade fever, stroke or stroke like symptoms, immediately go to the nearest Emergency Room or call 911 as soon as possible.      Stephanie Bell M.D on 2/23/2023. at 5:11 PM.

## 2023-02-23 NOTE — PLAN OF CARE
Problem: Infection  Goal: Absence of Infection Signs and Symptoms  Outcome: Ongoing, Progressing     Problem: Adult Inpatient Plan of Care  Goal: Plan of Care Review  Outcome: Ongoing, Progressing  Goal: Patient-Specific Goal (Individualized)  Outcome: Ongoing, Progressing  Goal: Absence of Hospital-Acquired Illness or Injury  Outcome: Ongoing, Progressing  Goal: Optimal Comfort and Wellbeing  Outcome: Ongoing, Progressing  Goal: Readiness for Transition of Care  Outcome: Ongoing, Progressing     Problem: Fall Injury Risk  Goal: Absence of Fall and Fall-Related Injury  Outcome: Ongoing, Progressing     Problem: Pain Acute  Goal: Acceptable Pain Control and Functional Ability  Outcome: Ongoing, Progressing

## 2023-02-23 NOTE — PLAN OF CARE
02/23/23 0752   Final Note   Assessment Type Final Discharge Note   Anticipated Discharge Disposition Home-Health   Hospital Resources/Appts/Education Provided Post-Acute resouces added to AVS   Post-Acute Status   Post-Acute Authorization Home Health   Home Health Status Set-up Complete/Auth obtained  (FOC resume with NSI)